# Patient Record
Sex: MALE | Race: OTHER | Employment: FULL TIME | URBAN - METROPOLITAN AREA
[De-identification: names, ages, dates, MRNs, and addresses within clinical notes are randomized per-mention and may not be internally consistent; named-entity substitution may affect disease eponyms.]

---

## 2017-05-15 ENCOUNTER — EMPLOYEE WELLNESS (OUTPATIENT)
Dept: OTHER | Age: 37
End: 2017-05-15

## 2017-05-15 LAB
CHOLESTEROL, TOTAL: 181 MG/DL (ref 0–199)
GLUCOSE BLD-MCNC: 87 MG/DL (ref 70–99)
HDLC SERPL-MCNC: 56 MG/DL (ref 40–60)
LDL CHOLESTEROL CALCULATED: 85 MG/DL
TRIGL SERPL-MCNC: 199 MG/DL (ref 0–150)

## 2018-03-20 VITALS — BODY MASS INDEX: 27.91 KG/M2 | WEIGHT: 189 LBS

## 2018-10-02 ENCOUNTER — OFFICE VISIT (OUTPATIENT)
Dept: DERMATOLOGY | Age: 38
End: 2018-10-02
Payer: COMMERCIAL

## 2018-10-02 DIAGNOSIS — L21.9 SEBORRHEIC DERMATITIS: ICD-10-CM

## 2018-10-02 DIAGNOSIS — L81.1 MELASMA: ICD-10-CM

## 2018-10-02 DIAGNOSIS — L71.0 PERIORIFICIAL DERMATITIS: Primary | ICD-10-CM

## 2018-10-02 PROCEDURE — 99202 OFFICE O/P NEW SF 15 MIN: CPT | Performed by: DERMATOLOGY

## 2018-10-02 RX ORDER — MINOCYCLINE HYDROCHLORIDE 50 MG/1
50 CAPSULE ORAL 2 TIMES DAILY
Qty: 60 CAPSULE | Refills: 1 | Status: SHIPPED | OUTPATIENT
Start: 2018-10-02 | End: 2019-08-27

## 2018-10-02 RX ORDER — PIMECROLIMUS 10 MG/G
CREAM TOPICAL
Qty: 30 G | Refills: 0 | Status: SHIPPED | OUTPATIENT
Start: 2018-10-02 | End: 2020-11-03 | Stop reason: ALTCHOICE

## 2019-04-13 ENCOUNTER — ANESTHESIA EVENT (OUTPATIENT)
Dept: OPERATING ROOM | Age: 39
End: 2019-04-13
Payer: COMMERCIAL

## 2019-04-13 ENCOUNTER — ANESTHESIA (OUTPATIENT)
Dept: OPERATING ROOM | Age: 39
End: 2019-04-13
Payer: COMMERCIAL

## 2019-04-13 ENCOUNTER — HOSPITAL ENCOUNTER (OUTPATIENT)
Age: 39
Setting detail: SURGERY ADMIT
Discharge: HOME | End: 2019-04-13
Attending: SURGERY | Admitting: SURGERY
Payer: COMMERCIAL

## 2019-04-13 ENCOUNTER — HOSPITAL ENCOUNTER (OUTPATIENT)
Dept: CT IMAGING | Age: 39
Discharge: HOME | End: 2019-04-13
Payer: COMMERCIAL

## 2019-04-13 VITALS
DIASTOLIC BLOOD PRESSURE: 116 MMHG | RESPIRATION RATE: 21 BRPM | HEART RATE: 106 BPM | TEMPERATURE: 97.8 F | OXYGEN SATURATION: 94 % | SYSTOLIC BLOOD PRESSURE: 157 MMHG

## 2019-04-13 VITALS
TEMPERATURE: 98.4 F | OXYGEN SATURATION: 100 % | DIASTOLIC BLOOD PRESSURE: 93 MMHG | RESPIRATION RATE: 20 BRPM | SYSTOLIC BLOOD PRESSURE: 153 MMHG

## 2019-04-13 DIAGNOSIS — R10.31 RLQ ABDOMINAL PAIN: ICD-10-CM

## 2019-04-13 DIAGNOSIS — R10.84 GENERALIZED ABDOMINAL PAIN: ICD-10-CM

## 2019-04-13 DIAGNOSIS — K35.30 ACUTE APPENDICITIS WITH LOCALIZED PERITONITIS, WITHOUT PERFORATION, ABSCESS, OR GANGRENE: Primary | ICD-10-CM

## 2019-04-13 PROCEDURE — 2500000003 HC RX 250 WO HCPCS

## 2019-04-13 PROCEDURE — 7100000010 HC PHASE II RECOVERY - FIRST 15 MIN: Performed by: SURGERY

## 2019-04-13 PROCEDURE — 6360000002 HC RX W HCPCS: Performed by: SURGERY

## 2019-04-13 PROCEDURE — 2580000003 HC RX 258: Performed by: SURGERY

## 2019-04-13 PROCEDURE — 74177 CT ABD & PELVIS W/CONTRAST: CPT

## 2019-04-13 PROCEDURE — 2500000003 HC RX 250 WO HCPCS: Performed by: SURGERY

## 2019-04-13 PROCEDURE — 3600000014 HC SURGERY LEVEL 4 ADDTL 15MIN: Performed by: SURGERY

## 2019-04-13 PROCEDURE — 99220 PR INITIAL OBSERVATION CARE/DAY 70 MINUTES: CPT | Performed by: SURGERY

## 2019-04-13 PROCEDURE — 2720000010 HC SURG SUPPLY STERILE: Performed by: SURGERY

## 2019-04-13 PROCEDURE — 6360000004 HC RX CONTRAST MEDICATION: Performed by: ORTHOPAEDIC SURGERY

## 2019-04-13 PROCEDURE — 3700000001 HC ADD 15 MINUTES (ANESTHESIA): Performed by: SURGERY

## 2019-04-13 PROCEDURE — 2580000003 HC RX 258: Performed by: PHYSICIAN ASSISTANT

## 2019-04-13 PROCEDURE — 2500000003 HC RX 250 WO HCPCS: Performed by: ANESTHESIOLOGY

## 2019-04-13 PROCEDURE — 6360000002 HC RX W HCPCS: Performed by: ANESTHESIOLOGY

## 2019-04-13 PROCEDURE — 44970 LAPAROSCOPY APPENDECTOMY: CPT | Performed by: SURGERY

## 2019-04-13 PROCEDURE — 7100000011 HC PHASE II RECOVERY - ADDTL 15 MIN: Performed by: SURGERY

## 2019-04-13 PROCEDURE — 88304 TISSUE EXAM BY PATHOLOGIST: CPT

## 2019-04-13 PROCEDURE — 2709999900 HC NON-CHARGEABLE SUPPLY: Performed by: SURGERY

## 2019-04-13 PROCEDURE — 3600000004 HC SURGERY LEVEL 4 BASE: Performed by: SURGERY

## 2019-04-13 PROCEDURE — 3700000000 HC ANESTHESIA ATTENDED CARE: Performed by: SURGERY

## 2019-04-13 PROCEDURE — 7100000000 HC PACU RECOVERY - FIRST 15 MIN: Performed by: SURGERY

## 2019-04-13 PROCEDURE — 7100000001 HC PACU RECOVERY - ADDTL 15 MIN: Performed by: SURGERY

## 2019-04-13 RX ORDER — ONDANSETRON 2 MG/ML
INJECTION INTRAMUSCULAR; INTRAVENOUS PRN
Status: DISCONTINUED | OUTPATIENT
Start: 2019-04-13 | End: 2019-04-13 | Stop reason: SDUPTHER

## 2019-04-13 RX ORDER — OXYCODONE HYDROCHLORIDE 5 MG/1
5 TABLET ORAL EVERY 6 HOURS PRN
Qty: 28 TABLET | Refills: 0 | Status: SHIPPED | OUTPATIENT
Start: 2019-04-13 | End: 2019-04-20

## 2019-04-13 RX ORDER — BUPIVACAINE HYDROCHLORIDE 5 MG/ML
INJECTION, SOLUTION EPIDURAL; INTRACAUDAL
Status: COMPLETED | OUTPATIENT
Start: 2019-04-13 | End: 2019-04-13

## 2019-04-13 RX ORDER — PROPOFOL 10 MG/ML
INJECTION, EMULSION INTRAVENOUS PRN
Status: DISCONTINUED | OUTPATIENT
Start: 2019-04-13 | End: 2019-04-13 | Stop reason: SDUPTHER

## 2019-04-13 RX ORDER — SUCCINYLCHOLINE/SOD CL,ISO/PF 200MG/10ML
SYRINGE (ML) INTRAVENOUS PRN
Status: DISCONTINUED | OUTPATIENT
Start: 2019-04-13 | End: 2019-04-13 | Stop reason: SDUPTHER

## 2019-04-13 RX ORDER — ROCURONIUM BROMIDE 10 MG/ML
INJECTION, SOLUTION INTRAVENOUS PRN
Status: DISCONTINUED | OUTPATIENT
Start: 2019-04-13 | End: 2019-04-13 | Stop reason: SDUPTHER

## 2019-04-13 RX ORDER — ONDANSETRON 2 MG/ML
4 INJECTION INTRAMUSCULAR; INTRAVENOUS
Status: DISCONTINUED | OUTPATIENT
Start: 2019-04-13 | End: 2019-04-13 | Stop reason: HOSPADM

## 2019-04-13 RX ORDER — FENTANYL CITRATE 50 UG/ML
25 INJECTION, SOLUTION INTRAMUSCULAR; INTRAVENOUS EVERY 5 MIN PRN
Status: DISCONTINUED | OUTPATIENT
Start: 2019-04-13 | End: 2019-04-13 | Stop reason: HOSPADM

## 2019-04-13 RX ORDER — DEXAMETHASONE SODIUM PHOSPHATE 4 MG/ML
INJECTION, SOLUTION INTRA-ARTICULAR; INTRALESIONAL; INTRAMUSCULAR; INTRAVENOUS; SOFT TISSUE PRN
Status: DISCONTINUED | OUTPATIENT
Start: 2019-04-13 | End: 2019-04-13 | Stop reason: SDUPTHER

## 2019-04-13 RX ORDER — FENTANYL CITRATE 50 UG/ML
INJECTION, SOLUTION INTRAMUSCULAR; INTRAVENOUS PRN
Status: DISCONTINUED | OUTPATIENT
Start: 2019-04-13 | End: 2019-04-13 | Stop reason: SDUPTHER

## 2019-04-13 RX ORDER — SODIUM CHLORIDE 9 MG/ML
INJECTION, SOLUTION INTRAVENOUS CONTINUOUS
Status: DISCONTINUED | OUTPATIENT
Start: 2019-04-13 | End: 2019-04-14 | Stop reason: HOSPADM

## 2019-04-13 RX ORDER — MIDAZOLAM HYDROCHLORIDE 1 MG/ML
INJECTION INTRAMUSCULAR; INTRAVENOUS PRN
Status: DISCONTINUED | OUTPATIENT
Start: 2019-04-13 | End: 2019-04-13 | Stop reason: SDUPTHER

## 2019-04-13 RX ORDER — FENTANYL CITRATE 50 UG/ML
50 INJECTION, SOLUTION INTRAMUSCULAR; INTRAVENOUS EVERY 5 MIN PRN
Status: DISCONTINUED | OUTPATIENT
Start: 2019-04-13 | End: 2019-04-13 | Stop reason: HOSPADM

## 2019-04-13 RX ORDER — KETOROLAC TROMETHAMINE 30 MG/ML
INJECTION, SOLUTION INTRAMUSCULAR; INTRAVENOUS PRN
Status: DISCONTINUED | OUTPATIENT
Start: 2019-04-13 | End: 2019-04-13 | Stop reason: SDUPTHER

## 2019-04-13 RX ORDER — MAGNESIUM HYDROXIDE 1200 MG/15ML
LIQUID ORAL CONTINUOUS PRN
Status: COMPLETED | OUTPATIENT
Start: 2019-04-13 | End: 2019-04-13

## 2019-04-13 RX ADMIN — PIPERACILLIN SODIUM,TAZOBACTAM SODIUM 3.38 G: 3; .375 INJECTION, POWDER, FOR SOLUTION INTRAVENOUS at 12:36

## 2019-04-13 RX ADMIN — FENTANYL CITRATE 100 MCG: 50 INJECTION INTRAMUSCULAR; INTRAVENOUS at 12:35

## 2019-04-13 RX ADMIN — SUGAMMADEX 300 MG: 100 INJECTION, SOLUTION INTRAVENOUS at 13:57

## 2019-04-13 RX ADMIN — SODIUM CHLORIDE: 9 INJECTION, SOLUTION INTRAVENOUS at 12:31

## 2019-04-13 RX ADMIN — DEXAMETHASONE SODIUM PHOSPHATE 8 MG: 4 INJECTION, SOLUTION INTRAMUSCULAR; INTRAVENOUS at 12:46

## 2019-04-13 RX ADMIN — KETOROLAC TROMETHAMINE 30 MG: 30 INJECTION, SOLUTION INTRAMUSCULAR at 13:54

## 2019-04-13 RX ADMIN — ROCURONIUM BROMIDE 35 MG: 10 INJECTION INTRAVENOUS at 12:44

## 2019-04-13 RX ADMIN — IOHEXOL 50 ML: 240 INJECTION, SOLUTION INTRATHECAL; INTRAVASCULAR; INTRAVENOUS; ORAL at 10:11

## 2019-04-13 RX ADMIN — MIDAZOLAM 2 MG: 1 INJECTION INTRAMUSCULAR; INTRAVENOUS at 12:31

## 2019-04-13 RX ADMIN — ROCURONIUM BROMIDE 10 MG: 10 INJECTION INTRAVENOUS at 13:28

## 2019-04-13 RX ADMIN — Medication 160 MG: at 12:35

## 2019-04-13 RX ADMIN — IOPAMIDOL 75 ML: 755 INJECTION, SOLUTION INTRAVENOUS at 10:11

## 2019-04-13 RX ADMIN — ONDANSETRON 4 MG: 2 INJECTION INTRAMUSCULAR; INTRAVENOUS at 12:46

## 2019-04-13 RX ADMIN — PROPOFOL 200 MG: 10 INJECTION, EMULSION INTRAVENOUS at 12:35

## 2019-04-13 RX ADMIN — ROCURONIUM BROMIDE 5 MG: 10 INJECTION INTRAVENOUS at 12:35

## 2019-04-13 ASSESSMENT — PULMONARY FUNCTION TESTS
PIF_VALUE: 24
PIF_VALUE: 21
PIF_VALUE: 21
PIF_VALUE: 23
PIF_VALUE: 23
PIF_VALUE: 19
PIF_VALUE: 24
PIF_VALUE: 4
PIF_VALUE: 14
PIF_VALUE: 15
PIF_VALUE: 24
PIF_VALUE: 22
PIF_VALUE: 14
PIF_VALUE: 24
PIF_VALUE: 18
PIF_VALUE: 24
PIF_VALUE: 23
PIF_VALUE: 18
PIF_VALUE: 22
PIF_VALUE: 24
PIF_VALUE: 23
PIF_VALUE: 17
PIF_VALUE: 21
PIF_VALUE: 24
PIF_VALUE: 19
PIF_VALUE: 24
PIF_VALUE: 24
PIF_VALUE: 23
PIF_VALUE: 24
PIF_VALUE: 25
PIF_VALUE: 24
PIF_VALUE: 24
PIF_VALUE: 18
PIF_VALUE: 4
PIF_VALUE: 21
PIF_VALUE: 24
PIF_VALUE: 14
PIF_VALUE: 12
PIF_VALUE: 25
PIF_VALUE: 18
PIF_VALUE: 21
PIF_VALUE: 24
PIF_VALUE: 15
PIF_VALUE: 19
PIF_VALUE: 2
PIF_VALUE: 23
PIF_VALUE: 20
PIF_VALUE: 15
PIF_VALUE: 18
PIF_VALUE: 15
PIF_VALUE: 23
PIF_VALUE: 25
PIF_VALUE: 0
PIF_VALUE: 21
PIF_VALUE: 24
PIF_VALUE: 18
PIF_VALUE: 25
PIF_VALUE: 24
PIF_VALUE: 23
PIF_VALUE: 21
PIF_VALUE: 22
PIF_VALUE: 23
PIF_VALUE: 23
PIF_VALUE: 14
PIF_VALUE: 1
PIF_VALUE: 24
PIF_VALUE: 18
PIF_VALUE: 23
PIF_VALUE: 23
PIF_VALUE: 24
PIF_VALUE: 24
PIF_VALUE: 23
PIF_VALUE: 24
PIF_VALUE: 20
PIF_VALUE: 24
PIF_VALUE: 17
PIF_VALUE: 18
PIF_VALUE: 24
PIF_VALUE: 14
PIF_VALUE: 24
PIF_VALUE: 18
PIF_VALUE: 19
PIF_VALUE: 1
PIF_VALUE: 0
PIF_VALUE: 15
PIF_VALUE: 22
PIF_VALUE: 24
PIF_VALUE: 13
PIF_VALUE: 24
PIF_VALUE: 23
PIF_VALUE: 18
PIF_VALUE: 24
PIF_VALUE: 14

## 2019-04-13 ASSESSMENT — PAIN - FUNCTIONAL ASSESSMENT: PAIN_FUNCTIONAL_ASSESSMENT: 0-10

## 2019-04-13 ASSESSMENT — PAIN DESCRIPTION - ONSET: ONSET: UNABLE TO TELL

## 2019-04-13 ASSESSMENT — PAIN DESCRIPTION - DESCRIPTORS
DESCRIPTORS: SHARP;SHOOTING
DESCRIPTORS: SHARP

## 2019-04-13 ASSESSMENT — PAIN SCALES - GENERAL
PAINLEVEL_OUTOF10: 0
PAINLEVEL_OUTOF10: 5

## 2019-04-13 ASSESSMENT — PAIN DESCRIPTION - ORIENTATION: ORIENTATION: LEFT

## 2019-04-13 ASSESSMENT — PAIN DESCRIPTION - FREQUENCY: FREQUENCY: CONTINUOUS

## 2019-04-13 ASSESSMENT — PAIN DESCRIPTION - LOCATION: LOCATION: ABDOMEN

## 2019-04-13 ASSESSMENT — PAIN DESCRIPTION - PAIN TYPE: TYPE: SURGICAL PAIN

## 2019-04-13 NOTE — H&P
lifestyle. Review of Systems:  General Denies any fever or chills  HEENT Denies any diplopia, tinnitus or vertigo  Resp Denies any shortness of breath, cough or wheezing  Cardiac Denies any chest pain, palpitations, claudication or edema  GI Denies any melena, hematochezia, hematemesis or pyrosis   Denies any frequency, urgency, hesitancy or incontinence  Heme Denies bruising or bleeding easily  Neuro Denies any focal motor or sensory deficits  OBJECTIVE:   VITALS:  vitals were not taken for this visit. CONSTITUTIONAL: Alert and oriented times 3, no acute distress and cooperative to examination with proper mood and affect. SKIN: Skin color, texture, turgor normal. No rashes or lesions. LYMPH: no cervical nodes, no inguinal nodes  HEENT: Head is normocephalic, atraumatic. EOMI, PERRLA. NECK: Supple, symmetrical, trachea midline, no adenopathy, thyroid symmetric, not enlarged and no tenderness, skin normal.  CHEST/LUNGS: chest symmetric with normal A/P diameter, normal respiratory rate and rhythm, lungs clear to auscultation without wheezes, rales or rhonchi. No accessory muscle use. Scars None   CARDIOVASCULAR: Heart sounds are normal.  Regular rate and rhythm without murmur, gallop or rub. Normal S1 and S2. . Carotid and femoral pulses 2+/4 and equal bilaterally. ABDOMEN: Normal shape. Tenderness: RLQ  RECTAL: deferred, not clinically indicated  NEUROLOGIC: There are no focalizing motor or sensory deficits. CN II-XII are grossly intact. Jerryl Boyers EXTREMITIES: no cyanosis, no clubbing and no edema. LABS:   No results for input(s): WBC, HGB, HCT, PLT, NA, K, CL, CO2, BUN, CREATININE, MG, PHOS, CALCIUM, PTT, INR, AST, ALT, BILITOT, BILIDIR, NITRU, COLORU, BACTERIA in the last 72 hours. Invalid input(s): PT, WBCU, RBCU, LEUKOCYTESUA  No results for input(s): ALKPHOS, ALT, AST, BILITOT, BILIDIR, LABALBU, AMYLASE, LIPASE in the last 72 hours.   CBC: No results found for: WBC, RBC, HGB, HCT, MCV, MCH, MCHC, RDW, PLT,

## 2019-04-13 NOTE — ANESTHESIA PRE PROCEDURE
Department of Anesthesiology  Preprocedure Note       Name:  Jaret Chiu   Age:  42 y.o.  :  1980                                          MRN:  [id number]         Date:  2019      Surgeon: Oriana Alarcon):  Annabel Jose MD    Procedure: LAPAROSCOPIC APPENDECTOMY (N/A Abdomen)    Medications prior to admission:   Prior to Admission medications    Medication Sig Start Date End Date Taking? Authorizing Provider   ranitidine (ZANTAC) 150 MG tablet Take 150 mg by mouth 2 times daily. Yes Historical Provider, MD   loratadine (CLARITIN) 10 MG tablet Take 10 mg by mouth daily. Yes Historical Provider, MD   oxyCODONE (ROXICODONE) 5 MG immediate release tablet Take 1 tablet by mouth every 6 hours as needed for Pain for up to 7 days. Intended supply: 7 days. Take lowest dose possible to manage pain 19  ARTIE Yeh   minocycline (MINOCIN;DYNACIN) 50 MG capsule Take 1 capsule by mouth 2 times daily 10/2/18   Luke Mccloud MD   pimecrolimus (ELIDEL) 1 % cream Apply topically 2 times daily until improved. 10/2/18   Luke Mccloud MD   tretinoin (RETIN-A) 0.025 % cream Apply a pea sized amount to the face at bedtime. 10/2/18   Luke Mccloud MD       Current medications:    Current Facility-Administered Medications   Medication Dose Route Frequency Provider Last Rate Last Dose    piperacillin-tazobactam (ZOSYN) 3.375 g in dextrose 5 % 50 mL IVPB (mini-bag)  3.375 g Intravenous Once Annabel Jose MD         Facility-Administered Medications Ordered in Other Encounters   Medication Dose Route Frequency Provider Last Rate Last Dose    0.9 % sodium chloride infusion   Intravenous Continuous ARTIE Yeh           Allergies:  No Known Allergies    Problem List:    Patient Active Problem List   Diagnosis Code    Acute appendicitis with localized peritonitis, without perforation, abscess, or gangrene K35.30       Past Medical History:  History reviewed.  No pertinent past medical history. Past Surgical History:  History reviewed. No pertinent surgical history. Social History:    Social History     Tobacco Use    Smoking status: Never Smoker    Smokeless tobacco: Never Used   Substance Use Topics    Alcohol use: Not on file                                Counseling given: Not Answered      Vital Signs (Current):   Vitals:    04/13/19 1217 04/13/19 1218   BP:  (!) 142/106   Pulse: 99    Resp: 16    Temp: 97.4 °F (36.3 °C)    TempSrc: Temporal    SpO2: 99%                                               BP Readings from Last 3 Encounters:   04/13/19 (!) 142/106   04/13/19 134/79   12/16/14 132/72       NPO Status: Time of last liquid consumption: 0900                        Time of last solid consumption: 2350                        Date of last liquid consumption: 04/13/19                        Date of last solid food consumption: 04/12/19    BMI:   Wt Readings from Last 3 Encounters:   05/15/17 189 lb (85.7 kg)   12/16/14 177 lb (80.3 kg)   05/06/14 175 lb (79.4 kg)     There is no height or weight on file to calculate BMI.    CBC: No results found for: WBC, RBC, HGB, HCT, MCV, RDW, PLT    CMP:   Lab Results   Component Value Date    GLUCOSE 87 05/15/2017       POC Tests: No results for input(s): POCGLU, POCNA, POCK, POCCL, POCBUN, POCHEMO, POCHCT in the last 72 hours.     Coags: No results found for: PROTIME, INR, APTT    HCG (If Applicable): No results found for: PREGTESTUR, PREGSERUM, HCG, HCGQUANT     ABGs: No results found for: PHART, PO2ART, VOA7EST, DKW9GQO, BEART, X5LCXJRO     Type & Screen (If Applicable):  No results found for: [facility]    Anesthesia Evaluation  Patient summary reviewed no history of anesthetic complications:   Airway: Mallampati: I  TM distance: >3 FB     Mouth opening: > = 3 FB Dental:          Pulmonary:Negative Pulmonary ROS breath sounds clear to auscultation                             Cardiovascular:Negative CV ROS  Exercise tolerance: good (>4 METS),           Rhythm: regular  Rate: normal                    Neuro/Psych:   Negative Neuro/Psych ROS              GI/Hepatic/Renal: Neg GI/Hepatic/Renal ROS            Endo/Other: Negative Endo/Other ROS                    Abdominal:           Vascular: negative vascular ROS. Anesthesia Plan      general     ASA 1 - emergent       Induction: intravenous and rapid sequence. MIPS: Postoperative opioids intended and Prophylactic antiemetics administered. Anesthetic plan and risks discussed with patient and spouse. DOS STAFF ADDENDUM:    Pt seen and examined, chart reviewed (including anesthesia, drug and allergy history). No interval changes to history and physical examination. Anesthetic plan, risks, benefits, alternatives, and personnel involved discussed with patient. Patient verbalized an understanding and agrees to proceed.       Zhen Hunter MD  April 13, 2019  12:38 PM            Zhen Hunter MD   4/13/2019

## 2019-04-13 NOTE — H&P
Surgery Consult Note     Fox Edouard PA-C  Pt Name: Dannie Floyd  MRN: [id number]  [place]: 1980  Date of evaluation: 4/13/2019  Primary Care Physician: Rose Romeo MD     IMPRESSIONS:   1. Acute appendicitis  PLANS:   1. Appendectomy  2. Monitor and control pain  3. OOB as tolerated  4. Antibiotics  5. IVF  SUBJECTIVE:   History of Chief Complaint:    Dannie Floyd is a 42 y.o. male who presents with RLQ abdominal pain. HE stated that this pain began last night. The pain continued in intensity, and he called me this AM.  At that time I ordered a CT scan that showed him to have an acute appendicitis. He denies having any other pain. Denies any CP, SOB, cough, lightheadedenss or dizziness. Past Medical History  Reviewed  has no past medical history on file. Past Surgical History  Reviewed has no past surgical history on file. Medications  Home Medications           Prior to Admission medications    Medication Sig Start Date End Date Taking? Authorizing Provider   minocycline (MINOCIN;DYNACIN) 50 MG capsule Take 1 capsule by mouth 2 times daily 10/2/18     Angela Steele MD   pimecrolimus (ELIDEL) 1 % cream Apply topically 2 times daily until improved. 10/2/18     Angela Steele MD   tretinoin (RETIN-A) 0.025 % cream Apply a pea sized amount to the face at bedtime. 10/2/18     Angela Steele MD   ranitidine (ZANTAC) 150 MG tablet Take 150 mg by mouth 2 times daily.       Historical Provider, MD   loratadine (CLARITIN) 10 MG tablet Take 10 mg by mouth daily.       Historical Provider, MD       Scheduled Meds:  Scheduled Medications        Continuous Infusions:  Infusions Meds    sodium chloride           PRN Meds:. Allergies  has No Known Allergies. Family History  Reviewedfamily history is not on file. Social History   reports that he has never smoked.  He has never used smokeless tobacco.  EDUCATION  Patient educated about their illness/diagnosis, stated above, and all questions answered. We discussed the importance of nutrition, medications they are taking, and healthy lifestyle. Review of Systems:  General Denies any fever or chills  HEENT Denies any diplopia, tinnitus or vertigo  Resp Denies any shortness of breath, cough or wheezing  Cardiac Denies any chest pain, palpitations, claudication or edema  GI Denies any melena, hematochezia, hematemesis or pyrosis   Denies any frequency, urgency, hesitancy or incontinence  Heme Denies bruising or bleeding easily  Neuro Denies any focal motor or sensory deficits  OBJECTIVE:   VITALS:  vitals were not taken for this visit. CONSTITUTIONAL: Alert and oriented times 3, no acute distress and cooperative to examination with proper mood and affect. SKIN: Skin color, texture, turgor normal. No rashes or lesions. LYMPH: no cervical nodes, no inguinal nodes  HEENT: Head is normocephalic, atraumatic. EOMI, PERRLA. NECK: Supple, symmetrical, trachea midline, no adenopathy, thyroid symmetric, not enlarged and no tenderness, skin normal.  CHEST/LUNGS: chest symmetric with normal A/P diameter, normal respiratory rate and rhythm, lungs clear to auscultation without wheezes, rales or rhonchi. No accessory muscle use. Scars None   CARDIOVASCULAR: Heart sounds are normal.  Regular rate and rhythm without murmur, gallop or rub. Normal S1 and S2. . Carotid and femoral pulses 2+/4 and equal bilaterally. ABDOMEN: Normal shape. Tenderness: RLQ  RECTAL: deferred, not clinically indicated  NEUROLOGIC: There are no focalizing motor or sensory deficits. CN II-XII are grossly intact. Velora Sis EXTREMITIES: no cyanosis, no clubbing and no edema.   LABS:   No results for input(s): WBC, HGB, HCT, PLT, NA, K, CL, CO2, BUN, CREATININE, MG, PHOS, CALCIUM, PTT, INR, AST, ALT, BILITOT, BILIDIR, NITRU, COLORU, BACTERIA in the last 72 hours.     Invalid input(s): PT, WBCU, RBCU, LEUKOCYTESUA  No results for input(s): ALKPHOS, ALT, AST, BILITOT, BILIDIR, LABALBU, AMYLASE, LIPASE in pain control and time away from work and daily activities. All questions were answered to patient's satisfaction.    ---------------------------------------------------------------------------------------------------------------------    CC/Reason for visit: RLQ pain    Detailed HPI per resident/NP note. PMH, PSH, Current Meds, Allergies reviewed. Fam Hx: no relevant fam Hx    REVIEW OF SYSTEMS:    Pertinent positives and negatives are mentioned in the HPI. Otherwise, all other systems were reviewed and negative. PHYSICAL EXAM:  BP (!) 142/106   Pulse 99   Temp 97.4 °F (36.3 °C) (Temporal)   Resp 16   SpO2 99%   Constitutional: Appears well-developed and well-nourished. Grooming appropriate. No gross deformities. There is no height or weight on file to calculate BMI. Eyes: No scleral icterus. Conjunctiva/lids normal. Vision intact grossly. Pupils equal/symmetric, reactive bilaterally. ENT: External ears/nose without defect, scars, or masses. Hearing grossly intact. No facial deformity. Lips normal, normal dentition. Neck: No masses. Trachea midline. No crepitus. Thyroid not enlarged. Cardiovascular: Normal rate. No peripheral edema. Abdominal aorta normal size to palpation. Pulmonary/Chest: Effort normal. No respiratory distress. No wheezes. No use of accessory muscles. Musculoskeletal: Normal range of motion x all 4 extremities and head/neck, without deformity, pain, or crepitus, with normal strength/tone. Nails without clubbing/cyanosis. Neurological: Alert and oriented to person, place, and time. No gross deficits. Sensation intact grossly. Skin: Skin is dry. No rashes noted. No pallor. No induration or nodules. Psychiatric: Normal mood and affect. Behavior normal. Oriented to person, place, and time. Judgment and insight reasonable.     Abdominal/wound: TTP RLQ    MDM:  Review/order labs: reviewed CBC, CMP  Review/order radiology: reviewed CT scan  Review/order other tests:

## 2019-04-13 NOTE — OP NOTE
OPERATIVE NOTE     Laith Kaplan  1980  [id number]  [facility] ORTHOPEDIC AND SP[facility] [place]    DATE OF PROCEDURE:  4/13/19    SURGEON:  Patience Burris M.D. PREOPERATIVE DIAGNOSIS:  Acute appendicitis. POSTOPERATIVE DIAGNOSIS:  Acute appendicitis. PROCEDURE:  Laparoscopic appendectomy. ANESTHESIA:  General endotracheal.    COMPLICATIONS:  None. SPECIMEN:  Appendix. FINDINGS:  Acute appendicitis, partially retrocecal location. INDICATIONS:  The patient is a 82-year-old male who presented with approximately 1 day of pain. Patient underwent a CT scan, which showed findings consistent with a dilated appendix and inflammatory changes. Patient was examined and imaging and lab findings were reviewed, which confirmed acute appendicitis. Patient was booked for laparoscopic appendectomy. I explained to patient as well as any present family, the risks, benefits, and alternatives of the procedure, including the risks of bleeding, infection, abscess formation, hernia, damage to intraabdominal structures including but not limited to: bowel, bladder, ureter, and other neurovascular structures. They understood the potential need for open operation or bowel resection. I answered all questions from the patient and available family regarding perioperative expectations and time away from work. Patient was consented to proceed. PROCEDURE DETAILS:  The patient was brought to operating room, placed supine  on the operating table. General endotracheal intubation was performed by anesthesiology. The left arm was padded and tucked. The patient's abdomen was prepped and draped in usual sterile fashion using chlorhexidine prep solution. A timeout was performed confirming the patient's identity as well as the operative site. Antibiotics were confirmed to be  perfusing. All safety points were followed. SCDs were on and were functioning. Chemical DVT prophylaxis was confirmed to be given.     A mix of 1% lidocaine and 0.5% bupivacaine with epinephrine was used to anesthetize the skin at Harris's point in the left upper quadrant, 2 fingerbreadth below the costal margin. An 11-blade scalpel was used to make a 5 mm incision. The 0 degree 5 mm laparoscope was used to enter the abdominal cavity using Optiview technique. No complications with abdominal entry. The abdomen was insufflated to 15 mm Hg. The patient was placed in slight Trendelenburg and right sided up positioning. Other ports were then placed under visualization, including a 12 mm port in the left lower quadrant, and a 5mm port in the suprapubic region. Using atraumatic graspers we did follow the taeniae coli of the cecum proximally to the appendix, which was then grasped and elevated. It appeared to be thickened and inflamed, consistent with acute appendicitis. There was no evidence of rupture. The Ligature device was then used to take down peritoneal attachments of the appendix as well as the cecum, as the appendix was partially retrocecally located. The ligature device was then used to dissect into the mesoappendix. A Rossi  grasper was then used to create a window between the mesoappendix and the base  of the appendix, and the ligature was used to incise the remainder of the mesoappendix, which was hemostatic. The Omer stapler, 45mm blue load was then used to staple across the base of the appendix flush with the cecum. We did confirm that there were no other structures near the staple line. A stapler was then fired in the  usual manner. The stapler was then removed and the Endocatch retrieval bag was placed into the abdominal cavity. The appendix was placed into the retrieval bag and brought out through the 12 mm left lower quadrant incision. This was passed off the table and will be sent down to pathology for permanent examination. We then inspected all 4 quadrants. There were no other gross abnormalities noted.  The staple line was inspected once

## 2019-04-13 NOTE — H&P
never smoked. He has never used smokeless tobacco.  EDUCATION  Patient educated about their illness/diagnosis, stated above, and all questions answered. We discussed the importance of nutrition, medications they are taking, and healthy lifestyle. Review of Systems:  General Denies any fever or chills  HEENT Denies any diplopia, tinnitus or vertigo  Resp Denies any shortness of breath, cough or wheezing  Cardiac Denies any chest pain, palpitations, claudication or edema  GI Denies any melena, hematochezia, hematemesis or pyrosis   Denies any frequency, urgency, hesitancy or incontinence  Heme Denies bruising or bleeding easily  Neuro Denies any focal motor or sensory deficits  OBJECTIVE:   VITALS:  temporal temperature is 97.4 °F (36.3 °C). His blood pressure is 142/106 (abnormal) and his pulse is 99. His respiration is 16 and oxygen saturation is 99%. CONSTITUTIONAL: Alert and oriented times 3, no acute distress and cooperative to examination with proper mood and affect. SKIN: Skin color, texture, turgor normal. No rashes or lesions. LYMPH: no cervical nodes, no inguinal nodes  HEENT: Head is normocephalic, atraumatic. EOMI, PERRLA. NECK: Supple, symmetrical, trachea midline, no adenopathy, thyroid symmetric, not enlarged and no tenderness, skin normal.  CHEST/LUNGS: chest symmetric with normal A/P diameter, normal respiratory rate and rhythm, lungs clear to auscultation without wheezes, rales or rhonchi. No accessory muscle use. Scars None   CARDIOVASCULAR: Heart sounds are normal.  Regular rate and rhythm without murmur, gallop or rub. Normal S1 and S2. . Carotid and femoral pulses 2+/4 and equal bilaterally. ABDOMEN: Normal shape. Tenderness: RLQ  RECTAL: deferred, not clinically indicated  NEUROLOGIC: There are no focalizing motor or sensory deficits. CN II-XII are grossly intact. Emiliano Burton EXTREMITIES: no cyanosis, no clubbing and no edema.   LABS:   No results for input(s): WBC, HGB, HCT, PLT, NA, K, CL, CO2, BUN, CREATININE, MG, PHOS, CALCIUM, PTT, INR, AST, ALT, BILITOT, BILIDIR, NITRU, COLORU, BACTERIA in the last 72 hours. Invalid input(s): PT, WBCU, RBCU, LEUKOCYTESUA  No results for input(s): ALKPHOS, ALT, AST, BILITOT, BILIDIR, LABALBU, AMYLASE, LIPASE in the last 72 hours. CBC: No results found for: WBC, RBC, HGB, HCT, MCV, MCH, MCHC, RDW, PLT, MPV  CMP:    Lab Results   Component Value Date    GLUCOSE 87 05/15/2017     Urine Culture:  No components found for: CURINE  Blood Culture:  No components found for: CBLOOD, CFUNGUSBL  RADIOLOGY:     CT scan abd/pelvis:  Mild to moderate acute appendicitis but without complicating feature such as   abscess or perforation.       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 Thank you for the interesting evaluation. Further recommendations to follow.       Electronically signed by ARTIE Mercado on 4/13/2019 at 2:04 PM

## 2019-04-13 NOTE — PROGRESS NOTES
Pt arrived to PACU from OR. Pt sleeping on RA, awakens easily. Abd soft and round. Abd incisions x3 DARION with surgical glue noted. Pt denies c/o pain at this time.   VSS

## 2019-08-27 ENCOUNTER — OFFICE VISIT (OUTPATIENT)
Dept: ENT CLINIC | Age: 39
End: 2019-08-27
Payer: COMMERCIAL

## 2019-08-27 VITALS
WEIGHT: 199 LBS | SYSTOLIC BLOOD PRESSURE: 133 MMHG | BODY MASS INDEX: 30.16 KG/M2 | HEIGHT: 68 IN | TEMPERATURE: 97.7 F | HEART RATE: 81 BPM | DIASTOLIC BLOOD PRESSURE: 93 MMHG

## 2019-08-27 DIAGNOSIS — K14.0 GLOSSITIS: Primary | ICD-10-CM

## 2019-08-27 DIAGNOSIS — H61.813 EXOSTOSIS OF BOTH EXTERNAL EAR CANALS: ICD-10-CM

## 2019-08-27 DIAGNOSIS — H61.23 BILATERAL IMPACTED CERUMEN: ICD-10-CM

## 2019-08-27 PROCEDURE — 69210 REMOVE IMPACTED EAR WAX UNI: CPT | Performed by: OTOLARYNGOLOGY

## 2019-08-27 PROCEDURE — 99243 OFF/OP CNSLTJ NEW/EST LOW 30: CPT | Performed by: OTOLARYNGOLOGY

## 2019-08-27 RX ORDER — MINOCYCLINE HYDROCHLORIDE 100 MG/1
100 CAPSULE ORAL 2 TIMES DAILY
Qty: 20 CAPSULE | Refills: 1 | Status: SHIPPED | OUTPATIENT
Start: 2019-08-27 | End: 2019-09-06

## 2019-09-12 ENCOUNTER — TELEPHONE (OUTPATIENT)
Dept: ENT CLINIC | Age: 39
End: 2019-09-12

## 2019-09-12 NOTE — TELEPHONE ENCOUNTER
Pt was seen on 8/27/19 for lesion on tongue. Pt calling to say he finished atb 2 days ago and lesion is unchanged.

## 2019-09-17 ENCOUNTER — OFFICE VISIT (OUTPATIENT)
Dept: ENT CLINIC | Age: 39
End: 2019-09-17
Payer: COMMERCIAL

## 2019-09-17 ENCOUNTER — TELEPHONE (OUTPATIENT)
Dept: DERMATOLOGY | Age: 39
End: 2019-09-17

## 2019-09-17 VITALS
DIASTOLIC BLOOD PRESSURE: 80 MMHG | HEART RATE: 89 BPM | SYSTOLIC BLOOD PRESSURE: 115 MMHG | HEIGHT: 69 IN | WEIGHT: 196 LBS | TEMPERATURE: 96.4 F | BODY MASS INDEX: 29.03 KG/M2

## 2019-09-17 DIAGNOSIS — K14.0 GLOSSITIS: Primary | ICD-10-CM

## 2019-09-17 PROCEDURE — 99212 OFFICE O/P EST SF 10 MIN: CPT | Performed by: OTOLARYNGOLOGY

## 2019-09-17 NOTE — PROGRESS NOTES
FOLLOW UP VISIT:      INTERIM HISTORY: Seen 1 week ago with a sore area on the dorsum of the tongue on the left. Treated with minocycline mouthwash. Still with some discomfort on the left anterior tongue. PAST MEDICAL HISTORY:   Social History     Tobacco Use   Smoking Status Never Smoker   Smokeless Tobacco Never Used   Tobacco Comment    will not start                                                     Social History     Substance and Sexual Activity   Alcohol Use Not on file                                                    Current Outpatient Medications:     pimecrolimus (ELIDEL) 1 % cream, Apply topically 2 times daily until improved. , Disp: 30 g, Rfl: 0    tretinoin (RETIN-A) 0.025 % cream, Apply a pea sized amount to the face at bedtime. , Disp: 20 g, Rfl: 4    ranitidine (ZANTAC) 150 MG tablet, Take 150 mg by mouth 2 times daily. , Disp: , Rfl:     loratadine (CLARITIN) 10 MG tablet, Take 10 mg by mouth daily. , Disp: , Rfl:                                                History reviewed. No pertinent past medical history. Past Surgical History:   Procedure Laterality Date    LAPAROSCOPIC APPENDECTOMY N/A 4/13/2019    LAPAROSCOPIC APPENDECTOMY performed by Anselmo Snyder MD at [facility]: Family history reviewed and except as pertinent to the interim history is not contributory. REVIEW OF SYSTEMS:  All pertinent positive and negative findings included in HPI. Otherwise, all other systems are reviewed and are negative    PHYSICAL EXAMINATION:   GENERAL: wdwn- no acute distress  RESPIRATORY: No stridor. COMMUNICATION :  Normal voice  MENTAL STATUS: Alert and oriented x3  HEAD AND FACE: No skin lesions of the face. EXTERNAL EARS AND NOSE: The external ears and nasal pyramid are normal.  FACIAL MUSCLES: All branches of the facial nerve intact. FACE PALPATION: Zygomatic arches and orbital rims are intact.   No tenderness over the

## 2019-09-17 NOTE — TELEPHONE ENCOUNTER
Pt lmvm 9.17.19 @ 3:01  Pt c/b [phone]  Pt states:   - MD with [facility]   - seen Dr. Fortino Lin   - ulcer on tongue   - about a month   - was on antibiotics not helping   - asking to be seen   Please call to discuss thanks

## 2019-09-19 ENCOUNTER — OFFICE VISIT (OUTPATIENT)
Dept: DERMATOLOGY | Age: 39
End: 2019-09-19
Payer: COMMERCIAL

## 2019-09-19 DIAGNOSIS — K14.1 GLOSSITIS, BENIGN MIGRATORY: Primary | ICD-10-CM

## 2019-09-19 PROCEDURE — 99213 OFFICE O/P EST LOW 20 MIN: CPT | Performed by: DERMATOLOGY

## 2019-09-19 RX ORDER — DEXAMETHASONE 0.5 MG/5ML
0.5 ELIXIR ORAL 3 TIMES DAILY
Qty: 210 ML | Refills: 1 | Status: SHIPPED | OUTPATIENT
Start: 2019-09-19 | End: 2019-10-03

## 2019-10-04 ENCOUNTER — OFFICE VISIT (OUTPATIENT)
Dept: INTERNAL MEDICINE CLINIC | Age: 39
End: 2019-10-04
Payer: COMMERCIAL

## 2019-10-04 VITALS
HEART RATE: 84 BPM | SYSTOLIC BLOOD PRESSURE: 115 MMHG | WEIGHT: 195 LBS | RESPIRATION RATE: 12 BRPM | HEIGHT: 69 IN | DIASTOLIC BLOOD PRESSURE: 80 MMHG | OXYGEN SATURATION: 99 % | BODY MASS INDEX: 28.88 KG/M2

## 2019-10-04 DIAGNOSIS — K21.9 GASTROESOPHAGEAL REFLUX DISEASE WITHOUT ESOPHAGITIS: ICD-10-CM

## 2019-10-04 DIAGNOSIS — Z79.899 DRUG THERAPY: ICD-10-CM

## 2019-10-04 DIAGNOSIS — Z11.4 SCREENING FOR HIV (HUMAN IMMUNODEFICIENCY VIRUS): ICD-10-CM

## 2019-10-04 DIAGNOSIS — Z13.220 SCREENING FOR HYPERLIPIDEMIA: ICD-10-CM

## 2019-10-04 DIAGNOSIS — Z00.00 ANNUAL PHYSICAL EXAM: Primary | ICD-10-CM

## 2019-10-04 DIAGNOSIS — Z13.1 SCREENING FOR DIABETES MELLITUS: ICD-10-CM

## 2019-10-04 PROCEDURE — 99385 PREV VISIT NEW AGE 18-39: CPT | Performed by: INTERNAL MEDICINE

## 2019-10-04 RX ORDER — ESOMEPRAZOLE MAGNESIUM 40 MG/1
40 CAPSULE, DELAYED RELEASE ORAL
Qty: 90 CAPSULE | Refills: 3 | Status: SHIPPED | OUTPATIENT
Start: 2019-10-04 | End: 2020-10-07 | Stop reason: SDUPTHER

## 2019-10-04 ASSESSMENT — PATIENT HEALTH QUESTIONNAIRE - PHQ9
SUM OF ALL RESPONSES TO PHQ QUESTIONS 1-9: 0
1. LITTLE INTEREST OR PLEASURE IN DOING THINGS: 0
2. FEELING DOWN, DEPRESSED OR HOPELESS: 0
SUM OF ALL RESPONSES TO PHQ QUESTIONS 1-9: 0
SUM OF ALL RESPONSES TO PHQ9 QUESTIONS 1 & 2: 0

## 2019-11-13 DIAGNOSIS — Z00.00 ANNUAL PHYSICAL EXAM: ICD-10-CM

## 2019-11-13 DIAGNOSIS — Z13.220 SCREENING FOR HYPERLIPIDEMIA: ICD-10-CM

## 2019-11-13 DIAGNOSIS — Z79.899 DRUG THERAPY: ICD-10-CM

## 2019-11-13 DIAGNOSIS — Z13.1 SCREENING FOR DIABETES MELLITUS: ICD-10-CM

## 2019-11-13 DIAGNOSIS — K21.9 GASTROESOPHAGEAL REFLUX DISEASE WITHOUT ESOPHAGITIS: ICD-10-CM

## 2019-11-13 DIAGNOSIS — Z11.4 SCREENING FOR HIV (HUMAN IMMUNODEFICIENCY VIRUS): ICD-10-CM

## 2019-11-13 LAB
A/G RATIO: 2.6 (ref 1.1–2.2)
ALBUMIN SERPL-MCNC: 5.2 G/DL (ref 3.4–5)
ALP BLD-CCNC: 70 U/L (ref 40–129)
ALT SERPL-CCNC: 21 U/L (ref 10–40)
ANION GAP SERPL CALCULATED.3IONS-SCNC: 14 MMOL/L (ref 3–16)
AST SERPL-CCNC: 26 U/L (ref 15–37)
BASOPHILS ABSOLUTE: 0 K/UL (ref 0–0.2)
BASOPHILS RELATIVE PERCENT: 0.4 %
BILIRUB SERPL-MCNC: 0.5 MG/DL (ref 0–1)
BUN BLDV-MCNC: 15 MG/DL (ref 7–20)
CALCIUM SERPL-MCNC: 9.6 MG/DL (ref 8.3–10.6)
CHLORIDE BLD-SCNC: 97 MMOL/L (ref 99–110)
CHOLESTEROL, TOTAL: 200 MG/DL (ref 0–199)
CO2: 25 MMOL/L (ref 21–32)
CREAT SERPL-MCNC: 0.8 MG/DL (ref 0.9–1.3)
EOSINOPHILS ABSOLUTE: 0.2 K/UL (ref 0–0.6)
EOSINOPHILS RELATIVE PERCENT: 3.5 %
GFR AFRICAN AMERICAN: >60
GFR NON-AFRICAN AMERICAN: >60
GLOBULIN: 2 G/DL
GLUCOSE BLD-MCNC: 94 MG/DL (ref 70–99)
HCT VFR BLD CALC: 44.6 % (ref 40.5–52.5)
HDLC SERPL-MCNC: 55 MG/DL (ref 40–60)
HEMOGLOBIN: 15.2 G/DL (ref 13.5–17.5)
HIV AG/AB: NORMAL
HIV ANTIGEN: NORMAL
HIV-1 ANTIBODY: NORMAL
HIV-2 AB: NORMAL
LDL CHOLESTEROL CALCULATED: 127 MG/DL
LYMPHOCYTES ABSOLUTE: 2.2 K/UL (ref 1–5.1)
LYMPHOCYTES RELATIVE PERCENT: 33.9 %
MAGNESIUM: 2 MG/DL (ref 1.8–2.4)
MCH RBC QN AUTO: 30.5 PG (ref 26–34)
MCHC RBC AUTO-ENTMCNC: 34.1 G/DL (ref 31–36)
MCV RBC AUTO: 89.4 FL (ref 80–100)
MONOCYTES ABSOLUTE: 0.5 K/UL (ref 0–1.3)
MONOCYTES RELATIVE PERCENT: 8.3 %
NEUTROPHILS ABSOLUTE: 3.5 K/UL (ref 1.7–7.7)
NEUTROPHILS RELATIVE PERCENT: 53.9 %
PDW BLD-RTO: 13.1 % (ref 12.4–15.4)
PLATELET # BLD: 167 K/UL (ref 135–450)
PMV BLD AUTO: 9.9 FL (ref 5–10.5)
POTASSIUM SERPL-SCNC: 4.4 MMOL/L (ref 3.5–5.1)
RBC # BLD: 4.98 M/UL (ref 4.2–5.9)
SODIUM BLD-SCNC: 136 MMOL/L (ref 136–145)
TOTAL PROTEIN: 7.2 G/DL (ref 6.4–8.2)
TRIGL SERPL-MCNC: 91 MG/DL (ref 0–150)
TSH SERPL DL<=0.05 MIU/L-ACNC: 1.82 UIU/ML (ref 0.27–4.2)
VITAMIN D 25-HYDROXY: 25.4 NG/ML
VLDLC SERPL CALC-MCNC: 18 MG/DL
WBC # BLD: 6.5 K/UL (ref 4–11)

## 2020-08-20 ENCOUNTER — OFFICE VISIT (OUTPATIENT)
Dept: ENT CLINIC | Age: 40
End: 2020-08-20
Payer: COMMERCIAL

## 2020-08-20 VITALS
TEMPERATURE: 97.3 F | DIASTOLIC BLOOD PRESSURE: 100 MMHG | BODY MASS INDEX: 27.76 KG/M2 | OXYGEN SATURATION: 99 % | HEART RATE: 106 BPM | WEIGHT: 188 LBS | SYSTOLIC BLOOD PRESSURE: 143 MMHG

## 2020-08-20 PROCEDURE — 69210 REMOVE IMPACTED EAR WAX UNI: CPT | Performed by: OTOLARYNGOLOGY

## 2020-08-20 NOTE — PROGRESS NOTES
Patient is following up for cerumen impactions. He has had his ears cleaned for a long time. 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 History of being a swimmer. No new complaints    Bilateral ear exam with cerumen removal  Right ear was visualized binocular scope. The patient had quite a bit of cerumen that I evacuated the Saavedra suction. This showed a fairly large bony exostosis of the anterior canal creating about a 50% obstruction. The underlying tympanic membrane that I can visualize appeared to be normal    On the left side he had minimal amount of wax removed with a Saavedra suction. He has a much smaller exit [place] of the posterior canal.  Tympanic membrane intact with an aerated middle ear    Plan  Patient will follow-up as needed to have the cerumen removed. If there is clear evidence that the right exostosis is enlarging, would consider surgical intervention to remove.

## 2020-10-07 RX ORDER — ESOMEPRAZOLE MAGNESIUM 40 MG/1
40 CAPSULE, DELAYED RELEASE ORAL
Qty: 90 CAPSULE | Refills: 3 | Status: SHIPPED | OUTPATIENT
Start: 2020-10-07 | End: 2021-09-29 | Stop reason: SDUPTHER

## 2020-11-03 ENCOUNTER — OFFICE VISIT (OUTPATIENT)
Dept: INTERNAL MEDICINE CLINIC | Age: 40
End: 2020-11-03
Payer: COMMERCIAL

## 2020-11-03 VITALS
WEIGHT: 190 LBS | DIASTOLIC BLOOD PRESSURE: 66 MMHG | SYSTOLIC BLOOD PRESSURE: 120 MMHG | RESPIRATION RATE: 12 BRPM | HEART RATE: 92 BPM | BODY MASS INDEX: 28.14 KG/M2 | HEIGHT: 69 IN | TEMPERATURE: 97.6 F

## 2020-11-03 PROCEDURE — 99395 PREV VISIT EST AGE 18-39: CPT | Performed by: INTERNAL MEDICINE

## 2020-11-03 ASSESSMENT — PATIENT HEALTH QUESTIONNAIRE - PHQ9
SUM OF ALL RESPONSES TO PHQ QUESTIONS 1-9: 0
SUM OF ALL RESPONSES TO PHQ9 QUESTIONS 1 & 2: 0
1. LITTLE INTEREST OR PLEASURE IN DOING THINGS: 0
SUM OF ALL RESPONSES TO PHQ QUESTIONS 1-9: 0
2. FEELING DOWN, DEPRESSED OR HOPELESS: 0
SUM OF ALL RESPONSES TO PHQ QUESTIONS 1-9: 0

## 2020-11-03 NOTE — PROGRESS NOTES
[facility]) Physicians  Internal Medicine  Patient Encounter  [address] [facility], D.O., [facility] Preventative Physical    Chief Complaint   Patient presents with    Annual Exam       HPI-- 47 y.o. male presents today requesting his annual preventative history and physical      Medical/Surgical Histories     Past Medical History:   Diagnosis Date    Appendicitis 04/23/2019    Chicken pox     GERD (gastroesophageal reflux disease)     Seasonal allergies           Past Surgical History:   Procedure Laterality Date    LAPAROSCOPIC APPENDECTOMY N/A 4/13/2019    LAPAROSCOPIC APPENDECTOMY performed by Cris Patterson MD at [facility]           Medications/Allergies     Current Outpatient Medications   Medication Sig Dispense Refill    esomeprazole (NEXIUM) 40 MG delayed release capsule Take 1 capsule by mouth every morning (before breakfast) 90 capsule 3    tretinoin (RETIN-A) 0.025 % cream APPLY A PEA SIZED AMOUNT EXTERNALLY TO FACE AT BEDTIME 20 g 3     No current facility-administered medications for this visit. No Known Allergies      Substance Use History     Social History     Tobacco Use    Smoking status: Never Smoker    Smokeless tobacco: Never Used    Tobacco comment: will not start    Substance Use Topics    Alcohol use: Yes     Alcohol/week: 5.0 standard drinks     Types: 3 Glasses of wine, 2 Shots of liquor per week    Drug use: Never          Family History     Family History   Problem Relation Age of Onset    High Blood Pressure Mother     High Blood Pressure Father     Colon Polyps Father     Colon Cancer Paternal Grandmother     Lung Cancer Maternal Grandfather     Brain Cancer Paternal Grandfather         Glioblastoma              REVIEW OF SYSTEMS:    CONSTITUTIONAL:  Neg   Recent weight changes, fatigue, fever, chills or night sweats, anorexia, Sleep difficulties  EYES: Neg  Blurry vision, loss of vision, double vision, tearing, itching, eye pain.   EARS:  Neg Hearing loss, tinnitus, vertigo, discharge or otalgia. NOSE:  Neg  Rhinorrhea, sneezing, itching, allergy, epistaxis, or snoring  MOUTH/THROAT:  Neg Bleeding gums, hoarseness, sore throat, dysphagia, throat infections, or dentures  RESPIRATORY:  Neg SOB ,wheeze, cough, sputum, hemoptysis. No report of + TB test.    CARDIOVASCULAR:  Neg Chest pain, palpitations, heart murmur, dyspnea on exertion, orthopnea, paroxysmal nocturnal dyspnea or edema of extremities, or claudication  GASTROINTESTINAL:  Neg   Nausea, vomiting, or diarrhea, constipation,  hematemesis, heart burn, dysphagia,change in bowel movements or stool caliber, hematochezia, melena, abdominal pain, or food intolerance. Colonoscopy: No  GENITOURINARY:  Neg  Urinary frequency, hesitancy, urgency, polyuria, dysuria, hematuria, nocturia, incontinence, change in stream, genital pain or swelling, kidney stone  HEMATOLOGIC/LYMPHATIC:  Neg  Anemia, bleeding dyscrasias, easy bruising, blood clots (DVT/PE), transfusions, or enlarged lymph nodes  MUSCULOSKELETAL:  Neg  New myalgias, bone pain, joint pain, joint swelling, neck pain, radicular pain, or fractures.  + Low back ache occasionally. NEUROLOGICAL:  Neg  Loss of Consciousness, memeory loss or forgetfulness, confusion, difficulty concentrating, seizures, insomina, aphasia or dysarthria unilateral weakness or paresthesias, ataxia, headaches, syncope, tremor, or H/O head trauma. PSYCHIATRIC:  Neg  Depression, anxiety, personality changes, nervousness, drug or alcohol use/abuse, H/O psych counseling: No, Psychotropics: No  SKIN :  Neg  Rash, nail changes, sun burns, tattoos, change in moles, or skin color changes  ENDOCRINE:  Neg  Polydipsia,polyuria,abnormal weight changes,heat /cold intolerance, Hair changes. No H/O Diabetes or Thyroid disease.      Preventive Care:    Health Maintenance   Topic Date Due    Flu vaccine (1) 09/01/2020    DTaP/Tdap/Td vaccine (2 - Td) 03/29/2023    HIV screen  Completed    Hepatitis A vaccine  Aged Out    Hepatitis B vaccine  Aged Out    Hib vaccine  Aged Out    Meningococcal (ACWY) vaccine  Aged Out    Pneumococcal 0-64 years Vaccine  Aged Out    Varicella vaccine  Discontinued      Self-testicular exams: Yes  Sexual activity: single partner, contraception - none   Last eye exam: 2019, normal, glasses  Exercise: no regular exercise  Seatbelt use: yes  Sunscreen Use: Yes      Physical Exam    /66   Pulse 92   Temp 97.6 °F (36.4 °C)   Resp 12   Ht 5' 9\" (1.753 m)   Wt 190 lb (86.2 kg)   BMI 28.06 kg/m²     Wt Readings from Last 3 Encounters:   11/03/20 190 lb (86.2 kg)   08/20/20 188 lb (85.3 kg)   10/04/19 195 lb (88.5 kg)       GEN:  47 y.o. male who is in NAD, A&O. He appears stated age and well nourished. He is cooperative and pleasant. HEAD:  NC/AT, no lesions. EYES:  ALISA, EOMI, No scleral icterus or conjunctival injection or discharge. Visual fields in tact to confrontation. Fundoscopic (non-dilated) grossly normal.  Disc margins well demarcated. EARS:  EAC's clear, TM's normal.  NOSE:  Nasal cavity is clear. No mucosal congestion or discharge. Sinuses are nontender. MOUTH & THROAT:  Oral cavity is clear without mucosal lesions. Tongue is midline. Dentition is in good repair. No pharyngeal erythema or exudate. NECK:  Supple. Full ROM. Trachea is midline. No increased JVD. No thyromegaly or nodules. No masses  LYMPH: No C/SC/A/F nodes  CARDIAC:  S1S2 NL. Regular rhythm. No murmur/clicks/rubs. No ectopy. PMI is non-displaced. VASC:  Pedal pulses 2/4. Carotid upstrokes 2+. No bruits noted. PULM:  Lungs are CTA. Symmetric breath sounds noted. AP Diameter NL. GI:  Abdomen is soft and nontender. No distension. No organomegaly. No masses. No pulsatile masses. : Deferred at this time  BREAST:  Deferred at this time  EXT:  No Cyanosis or clubbing. No edema.     SKIN: Warm and dry, normal turgor, no rash or lesions of concern. NEURO:  Cranial nerves 2-12 are NL. Speech fluent and coherent. Strength is 5/5 in all muscle groups. No sensory deficits. No focal or lateralizing deficits. Reflexes 2/4 and symmetric. Gait is normal.  MS:  No C/T/L paraspinal tenderness. No scoliosis. No joint effusions. Full joint ROM. PSYCH:  Mood and affect NL. Judgement and insight NL. Assessment/Plan:  Zuleika Goldman was seen today for annual exam.    Diagnoses and all orders for this visit:    Annual physical exam  --Gerald Yip is a 68-year-old healthy male seen today for his complete annual physical.  --All care gaps identified and addressed. --Given his family history of cancers, would start prostate cancer screening at age 39 and with his father's history of colon polyp would start screening colonoscopies at age 36.  -     Magnesium; Future  -     Basic Metabolic Panel; Future  -     Lipid Panel; Future  -     Vitamin D 25 Hydroxy; Future    Screening for diabetes mellitus  -     Basic Metabolic Panel; Future    Screening for hyperlipidemia  -     Lipid Panel; Future    Gastroesophageal reflux disease without esophagitis  -     Magnesium; Future    Vitamin D deficiency  -     Vitamin D 25 Hydroxy;  Future           Preventive plan of care for Becca Alvarez        11/3/2020           Preventive Measures Status       Recommendations for screening   Prostate Cancer Screen  No results found for: PSA  Test is due at age 39    Colon Cancer Screen  Last colonoscopy: None Test is due at age 36   Diabetes Screen  Glucose (mg/dL)   Date Value   2019 94    Test recommended and ordered   Cholesterol Screen  Lab Results   Component Value Date    CHOL 200 (H) 2019    TRIG 91 2019    HDL 55 2019    LDLCALC 127 (H) 2019    Test recommended and ordered   Hepatitis C screening: None Recommended for patients born between [address] screenin2019 Recommended for patients between the ages of 15-65 who have never been tested regardless of risk   Aspirin for Cardiovascular Prevention   No Not indicated    Recommended Immunizations    Immunization History   Administered Date(s) Administered    Influenza Virus Vaccine 10/22/2019    Influenza vaccine: recommended every fall     Pneumonia vaccine: Due at age 72    Shingles vaccine: recommended as a one time dose after the age of 61    Tetanus vaccine: tetanus and diptheria/pertussis vaccine (Td/Tdap) recommended every 10 years- due 2029              Additional Recommendations   1. Use Sunscreen daily when exposed to the sun to reduce the risk of skin cancer. 2. Continue a healthy lifestyle including a healthy diet and aerobic exercise  3. Always wear a seat belt  4. Always wear a helmet when riding a bike or motorcycle  5. Update eye exam every 2 years  6. Perform monthly self testicle checks    Here are a few  Reliable websites with a variety of health and wellness information:     www.myProspectNowcheck. heart. org     www.nutritionsource. org     www. americanheart. org     www. diabetes. org     www.[facility]     www.[facility] CenterXenetic BiosciencesCash Check CardMer[facility] Hospital Washington)

## 2020-11-03 NOTE — PATIENT INSTRUCTIONS
Patient Education        Learning About Vitamin D  Why is it important to get enough vitamin D? Your body needs vitamin D to absorb calcium. Calcium keeps your bones and muscles, including your heart, healthy and strong. If your muscles don't get enough calcium, they can cramp, hurt, or feel weak. You may have long-term (chronic) muscle aches and pains. If you don't get enough vitamin D throughout life, you have an increased chance of having thin and brittle bones (osteoporosis) in your later years. Children who don't get enough vitamin D may not grow as much as others their age. They also have a chance of getting a rare disease called rickets. It causes weak bones. Vitamin D and calcium are added to many foods. And your body uses sunshine to make its own vitamin D. How much vitamin D do you need? The [place] of Medicine recommends that people ages 3 through 79 get 600 IU (international units) every day. Adults 71 and older need 800 IU every day. Blood tests for vitamin D can check your vitamin D level. But there is no standard normal range used by all laboratories. You're likely getting enough vitamin D if your levels are in the range of 20 to 50 ng/mL. How can you get more vitamin D? Foods that contain vitamin D include:  · [place], tuna, and mackerel. These are some of the best foods to eat when you need to get more vitamin D.  · Cheese, egg yolks, and beef liver. These foods have vitamin D in small amounts. · Milk, soy drinks, orange juice, yogurt, margarine, and some kinds of cereal have vitamin D added to them. Some people don't make vitamin D as well as others. They may have to take extra care in getting enough vitamin D. Things that reduce how much vitamin D your body makes include:  · Dark skin, such as many  Americans have. · Age, especially if you are older than 72. · Digestive problems, such as Crohn's or celiac disease. · Liver and kidney disease.   Some people who do not get eating late at night and losing weight. Follow-up care is a key part of your treatment and safety. Be sure to make and go to all appointments, and call your doctor if you are having problems. It's also a good idea to know your test results and keep a list of the medicines you take. How can you care for yourself at home? · Take your medicines exactly as prescribed. Call your doctor if you think you are having a problem with your medicine. · Your doctor may recommend over-the-counter medicine. For mild or occasional indigestion, antacids, such as Tums, Gaviscon, Mylanta, or Maalox, may help. Your doctor also may recommend over-the-counter acid reducers, such as famotidine (Pepcid AC), cimetidine (Tagamet HB), or omeprazole (Prilosec). Read and follow all instructions on the label. If you use these medicines often, talk with your doctor. · Change your eating habits. ? It's best to eat several small meals instead of two or three large meals. ? After you eat, wait 2 to 3 hours before you lie down. ? Chocolate, mint, and alcohol can make GERD worse. ? Spicy foods, foods that have a lot of acid (like tomatoes and oranges), and coffee can make GERD symptoms worse in some people. If your symptoms are worse after you eat a certain food, you may want to stop eating that food to see if your symptoms get better. · Do not smoke or chew tobacco. Smoking can make GERD worse. If you need help quitting, talk to your doctor about stop-smoking programs and medicines. These can increase your chances of quitting for good. · If you have GERD symptoms at night, raise the head of your bed 6 to 8 inches by putting the frame on blocks or placing a foam wedge under the head of your mattress. (Adding extra pillows does not work.)  · Do not wear tight clothing around your middle. · Lose weight if you need to. Losing just 5 to 10 pounds can help. When should you call for help?    Call your doctor now or seek immediate medical care such as running, swimming, cycling, or playing tennis or team sports. Discuss any changes in your exercise program with your doctor. · Do not smoke or allow others to smoke around you. If you need help quitting, talk to your doctor about stop-smoking programs and medicines. These can increase your chances of quitting for good. · Talk to your doctor about whether you have any risk factors for sexually transmitted infections (STIs). Having one sex partner (who does not have STIs and does not have sex with anyone else) is a good way to avoid these infections. · Use birth control if you do not want to have children at this time. Talk with your doctor about the choices available and what might be best for you. · Protect your skin from too much sun. When you're outdoors from 10 a.m. to 4 p.m., stay in the shade or cover up with clothing and a hat with a wide brim. Wear sunglasses that block UV rays. Even when it's cloudy, put broad-spectrum sunscreen (SPF 30 or higher) on any exposed skin. · See a dentist one or two times a year for checkups and to have your teeth cleaned. · Wear a seat belt in the car. Follow your doctor's advice about when to have certain tests. These tests can spot problems early. For everyone  · Cholesterol. Have the fat (cholesterol) in your blood tested after age 21. Your doctor will tell you how often to have this done based on your age, family history, or other things that can increase your risk for heart disease. · Blood pressure. Have your blood pressure checked during a routine doctor visit. Your doctor will tell you how often to check your blood pressure based on your age, your blood pressure results, and other factors. · Vision. Talk with your doctor about how often to have a glaucoma test.  · Diabetes. Ask your doctor whether you should have tests for diabetes. · Colon cancer. Your risk for colorectal cancer gets higher as you get older.  Some experts say that adults should start regular screening at age 48 and stop at age 76. Others say to start before age 48 or continue after age 76. Talk with your doctor about your risk and when to start and stop screening. For women  · Breast exam and mammogram. Talk to your doctor about when you should have a clinical breast exam and a mammogram. Medical experts differ on whether and how often women under 50 should have these tests. Your doctor can help you decide what is right for you. · Cervical cancer screening test and pelvic exam. Begin with a Pap test at age 24. The test often is part of a pelvic exam. Starting at age 27, you may choose to have a Pap test, an HPV test, or both tests at the same time (called co-testing). Talk with your doctor about how often to have testing. · Tests for sexually transmitted infections (STIs). Ask whether you should have tests for STIs. You may be at risk if you have sex with more than one person, especially if your partners do not wear condoms. For men  · Tests for sexually transmitted infections (STIs). Ask whether you should have tests for STIs. You may be at risk if you have sex with more than one person, especially if you do not wear a condom. · Testicular cancer exam. Ask your doctor whether you should check your testicles regularly. · Prostate exam. Talk to your doctor about whether you should have a blood test (called a PSA test) for prostate cancer. Experts differ on whether and when men should have this test. Some experts suggest it if you are older than 39 and are -American or have a father or brother who got prostate cancer when he was younger than 72. When should you call for help? Watch closely for changes in your health, and be sure to contact your doctor if you have any problems or symptoms that concern you. Where can you learn more? Go to https://kurtis.health-partners. org and sign in to your [employer] account.  Enter P072 in the InCrowd box to learn more about \"Well Visit, Ages 25 to 48: Care Instructions. \"     If you do not have an account, please click on the \"Sign Up Now\" link. Current as of: May 27, 2020               Content Version: 12.6  © 9046-3734 [employer]. Care instructions adapted under license by [facility] ([facility]). If you have questions about a medical condition or this instruction, always ask your healthcare professional. [address] any warranty or liability for your use of this information.

## 2020-12-08 DIAGNOSIS — Z13.220 SCREENING FOR HYPERLIPIDEMIA: ICD-10-CM

## 2020-12-08 DIAGNOSIS — K21.9 GASTROESOPHAGEAL REFLUX DISEASE WITHOUT ESOPHAGITIS: ICD-10-CM

## 2020-12-08 DIAGNOSIS — E55.9 VITAMIN D DEFICIENCY: ICD-10-CM

## 2020-12-08 DIAGNOSIS — Z13.1 SCREENING FOR DIABETES MELLITUS: ICD-10-CM

## 2020-12-08 DIAGNOSIS — Z00.00 ANNUAL PHYSICAL EXAM: ICD-10-CM

## 2020-12-08 LAB
ANION GAP SERPL CALCULATED.3IONS-SCNC: 14 MMOL/L (ref 3–16)
BUN BLDV-MCNC: 15 MG/DL (ref 7–20)
CALCIUM SERPL-MCNC: 10.1 MG/DL (ref 8.3–10.6)
CHLORIDE BLD-SCNC: 99 MMOL/L (ref 99–110)
CHOLESTEROL, TOTAL: 229 MG/DL (ref 0–199)
CO2: 26 MMOL/L (ref 21–32)
CREAT SERPL-MCNC: 0.9 MG/DL (ref 0.9–1.3)
GFR AFRICAN AMERICAN: >60
GFR NON-AFRICAN AMERICAN: >60
GLUCOSE BLD-MCNC: 108 MG/DL (ref 70–99)
HDLC SERPL-MCNC: 68 MG/DL (ref 40–60)
LDL CHOLESTEROL CALCULATED: 128 MG/DL
MAGNESIUM: 2.1 MG/DL (ref 1.8–2.4)
POTASSIUM SERPL-SCNC: 4.6 MMOL/L (ref 3.5–5.1)
SODIUM BLD-SCNC: 139 MMOL/L (ref 136–145)
TRIGL SERPL-MCNC: 165 MG/DL (ref 0–150)
VITAMIN D 25-HYDROXY: 33.6 NG/ML
VLDLC SERPL CALC-MCNC: 33 MG/DL

## 2021-05-26 ENCOUNTER — OFFICE VISIT (OUTPATIENT)
Dept: ENT CLINIC | Age: 41
End: 2021-05-26
Payer: COMMERCIAL

## 2021-05-26 VITALS — SYSTOLIC BLOOD PRESSURE: 145 MMHG | DIASTOLIC BLOOD PRESSURE: 96 MMHG | TEMPERATURE: 97.6 F | HEART RATE: 77 BPM

## 2021-05-26 DIAGNOSIS — H61.21 IMPACTED CERUMEN OF RIGHT EAR: Primary | ICD-10-CM

## 2021-05-26 PROCEDURE — 69210 REMOVE IMPACTED EAR WAX UNI: CPT | Performed by: OTOLARYNGOLOGY

## 2021-05-26 NOTE — PROGRESS NOTES
Patient is following up for cerumen impaction. I last saw him on August 23, 2020. He has a prominent bony exostosis on the right ear which is causing the cerumen impaction to build up. He says that he feels like is a little bit plugged up today. About to go on vacation wants to have it cleaned    On the right side the patient had a bony exostosis that appears to be stable. He had cerumen adjacent to it that was mostly occluding his external auditory canal.  This was removed. Tympanic membrane appeared to be normal.  On the left side there was minimal wax. Tympanic membrane intact    Plan  Patient can follow-up every 6 months for cerumen removal.  If there is any evidence that the bony exostosis starts getting bigger could consider having it removed.

## 2021-09-29 DIAGNOSIS — K21.9 GASTROESOPHAGEAL REFLUX DISEASE WITHOUT ESOPHAGITIS: ICD-10-CM

## 2021-09-29 RX ORDER — ESOMEPRAZOLE MAGNESIUM 40 MG/1
40 CAPSULE, DELAYED RELEASE ORAL
Qty: 90 CAPSULE | Refills: 3 | Status: SHIPPED | OUTPATIENT
Start: 2021-09-29 | End: 2022-10-21 | Stop reason: SDUPTHER

## 2021-09-29 NOTE — TELEPHONE ENCOUNTER
Last appointment: 11/3/2020  Next appointment: Visit date not found  Last refill: 10/7/20  Sent [employer] message to schedule next appointment due in November.
Other Specify

## 2022-04-13 ENCOUNTER — OFFICE VISIT (OUTPATIENT)
Dept: ENT CLINIC | Age: 42
End: 2022-04-13
Payer: COMMERCIAL

## 2022-04-13 DIAGNOSIS — J01.90 ACUTE SINUSITIS, RECURRENCE NOT SPECIFIED, UNSPECIFIED LOCATION: Primary | ICD-10-CM

## 2022-04-13 DIAGNOSIS — J33.9 NASAL POLYPS: ICD-10-CM

## 2022-04-13 PROCEDURE — 99212 OFFICE O/P EST SF 10 MIN: CPT | Performed by: OTOLARYNGOLOGY

## 2022-04-13 RX ORDER — PREDNISONE 10 MG/1
TABLET ORAL
Qty: 30 TABLET | Refills: 0 | Status: SHIPPED | OUTPATIENT
Start: 2022-04-13

## 2022-04-13 RX ORDER — DOXYCYCLINE HYCLATE 100 MG
100 TABLET ORAL 2 TIMES DAILY
Qty: 28 TABLET | Refills: 0 | Status: SHIPPED | OUTPATIENT
Start: 2022-04-13 | End: 2022-04-27

## 2022-04-13 NOTE — PROGRESS NOTES
Patient presents for evaluation of some nasal issues. Couple weeks ago started feeling like he was get a little bit sick. Is tried over-the-counter medications such as Afrin for a few days as well as decongestants. Does not think that he has particularly bad allergies. The right side is worse than the left. Does have some drainage. Nasal endoscopy  The bilateral nasal cavity was examined using a rigid scope. On the left side the patient has fairly diffuse mucosal edema with some yellow-colored drainage. The middle turbinate is noted and is edematous, but no obvious polyps. On the right side he has what is possibly an edematous wide middle turbinate or a polyp that is obscuring the actual middle turbinate. There is some purulent drainage around it. He does have an inferior rightward septal deviation as well. Plan  Patient appears to have a sinusitis. He also may have polyps. He had 2 weeks of symptoms on 1 to treat him with antibiotics and a prednisone taper. I would like for him to do the Flonase as well. I will see him back in the office to take another look. If there is any evidence of ongoing sinusitis or polyps will probably evaluate the CT scan.

## 2022-06-27 NOTE — PROGRESS NOTES
AND FACE:  No abnormalities of the sin of face or head  EXTERNAL EARS AND NOSE:  Normal pinnae bilateral  FACIAL MUSCLES:  All branches of facial nerve intact  EXTRAOCULAR MUSCLES: Intact with full range of motion  FACE PALPATION:  No tenderness over sinuses. Zygomatic arches and orbital rims intact  OTOSCOPY: Cerumen occludes both external auditory canals. In addition there are exostoses of both external ear canals, the right is larger than the left  TUNING FORKS: Rinne ++ Treadwell midline at 512 Hz  INTRANASAL:  Septum midline, turbinates normal, meati clear. LIPS, TEETH, GINGIVA:  Normal mucosa  PHARYNX: Tongue mucosa appears normal today. NECK:  No masses. LYMPHATIC:  No cervical adenopathy  SALIVARY GLANDS:  No swelling or masses in the parotid or submandibular salivary glands  THYROID:  No goiter or thyroid masses. IMPRESSION: Impacted cerumen bilateral, glossitis. PLAN: Cerumen removed from both the ears using the microscope because of the exostoses. Tympanic membranes and middle ear spaces appear normal.  Ring is now improved. Minocycline oral irrigation prescribed for glossitis. FOLLOW-UP: As needed. 27-Jun-2022 08:24

## 2022-10-21 DIAGNOSIS — K21.9 GASTROESOPHAGEAL REFLUX DISEASE WITHOUT ESOPHAGITIS: ICD-10-CM

## 2022-10-23 RX ORDER — ESOMEPRAZOLE MAGNESIUM 40 MG/1
CAPSULE, DELAYED RELEASE ORAL
Qty: 90 CAPSULE | Refills: 3 | OUTPATIENT
Start: 2022-10-23

## 2022-10-23 RX ORDER — ESOMEPRAZOLE MAGNESIUM 40 MG/1
40 CAPSULE, DELAYED RELEASE ORAL
Qty: 90 CAPSULE | Refills: 0 | Status: SHIPPED | OUTPATIENT
Start: 2022-10-23

## 2023-01-18 ENCOUNTER — OFFICE VISIT (OUTPATIENT)
Dept: INTERNAL MEDICINE CLINIC | Age: 43
End: 2023-01-18
Payer: COMMERCIAL

## 2023-01-18 VITALS
SYSTOLIC BLOOD PRESSURE: 132 MMHG | HEART RATE: 93 BPM | WEIGHT: 193 LBS | HEIGHT: 69 IN | OXYGEN SATURATION: 97 % | BODY MASS INDEX: 28.58 KG/M2 | DIASTOLIC BLOOD PRESSURE: 68 MMHG

## 2023-01-18 DIAGNOSIS — Z12.11 SCREEN FOR COLON CANCER: ICD-10-CM

## 2023-01-18 DIAGNOSIS — Z79.899 DRUG THERAPY: ICD-10-CM

## 2023-01-18 DIAGNOSIS — Z11.59 NEED FOR HEPATITIS C SCREENING TEST: ICD-10-CM

## 2023-01-18 DIAGNOSIS — Z00.00 ANNUAL PHYSICAL EXAM: Primary | ICD-10-CM

## 2023-01-18 DIAGNOSIS — Z83.71 FAMILY HX COLONIC POLYPS: ICD-10-CM

## 2023-01-18 DIAGNOSIS — D22.9 MULTIPLE NEVI: ICD-10-CM

## 2023-01-18 DIAGNOSIS — Z80.0 FAMILY HISTORY OF COLON CANCER: ICD-10-CM

## 2023-01-18 DIAGNOSIS — K21.9 GASTROESOPHAGEAL REFLUX DISEASE WITHOUT ESOPHAGITIS: ICD-10-CM

## 2023-01-18 DIAGNOSIS — Z13.220 SCREENING FOR HYPERLIPIDEMIA: ICD-10-CM

## 2023-01-18 DIAGNOSIS — Z13.1 SCREENING FOR DIABETES MELLITUS: ICD-10-CM

## 2023-01-18 PROCEDURE — 99396 PREV VISIT EST AGE 40-64: CPT | Performed by: INTERNAL MEDICINE

## 2023-01-18 SDOH — ECONOMIC STABILITY: FOOD INSECURITY: WITHIN THE PAST 12 MONTHS, THE FOOD YOU BOUGHT JUST DIDN'T LAST AND YOU DIDN'T HAVE MONEY TO GET MORE.: NEVER TRUE

## 2023-01-18 SDOH — ECONOMIC STABILITY: FOOD INSECURITY: WITHIN THE PAST 12 MONTHS, YOU WORRIED THAT YOUR FOOD WOULD RUN OUT BEFORE YOU GOT MONEY TO BUY MORE.: NEVER TRUE

## 2023-01-18 ASSESSMENT — PATIENT HEALTH QUESTIONNAIRE - PHQ9
SUM OF ALL RESPONSES TO PHQ QUESTIONS 1-9: 0
1. LITTLE INTEREST OR PLEASURE IN DOING THINGS: 0
SUM OF ALL RESPONSES TO PHQ QUESTIONS 1-9: 0
SUM OF ALL RESPONSES TO PHQ9 QUESTIONS 1 & 2: 0
SUM OF ALL RESPONSES TO PHQ QUESTIONS 1-9: 0
SUM OF ALL RESPONSES TO PHQ QUESTIONS 1-9: 0
2. FEELING DOWN, DEPRESSED OR HOPELESS: 0

## 2023-01-18 ASSESSMENT — SOCIAL DETERMINANTS OF HEALTH (SDOH): HOW HARD IS IT FOR YOU TO PAY FOR THE VERY BASICS LIKE FOOD, HOUSING, MEDICAL CARE, AND HEATING?: NOT HARD AT ALL

## 2023-01-18 NOTE — PATIENT INSTRUCTIONS
Preventive plan of care for Spenser Mcneal        2023           Preventive Measures Status       Recommendations for screening   Prostate Cancer Screen  No results found for: PSA  Test is due at age 39    Colon Cancer Screen  Last colonoscopy: None Test is due at age 36 due to family history of colon polyps and colon cancer-referral provided   Diabetes Screen  Glucose (mg/dL)   Date Value   2020 108 (H)    Test recommended and ordered   Cholesterol Screen  Lab Results   Component Value Date    CHOL 229 (H) 2020    TRIG 165 (H) 2020    HDL 68 (H) 2020    [address] 128 (H) 2020    Test recommended and ordered   Hepatitis C screening: None Recommended for patients born between 1945 and 1965   HIV screenin2019 Recommended for patients between the ages of 15-65 who have never been tested regardless of risk   Aspirin for Cardiovascular Prevention   No Not indicated    Recommended Immunizations    Immunization History   Administered Date(s) Administered    COVID-19, MODERNA BLUE border, Primary or Immunocompromised, (age 12y+), IM, 100 mcg/0.5mL 2020, 2021, 10/26/2021    Influenza Virus Vaccine 10/22/2019, 2021    Influenza vaccine: recommended every fall     Pneumonia vaccine: Due at age 72    Shingles vaccine: Shingrix due at age 48    Tetanus vaccine: tetanus and diptheria/pertussis vaccine (Td/Tdap) recommended every 10 years- due               Additional Recommendations   1. Use Sunscreen daily when exposed to the sun to reduce the risk of skin cancer. 2. Continue a healthy lifestyle including a healthy diet and aerobic exercise  3. Always wear a seat belt  4. Always wear a helmet when riding a bike or motorcycle  5. Update eye exam every 2 years  6. Perform monthly self testicle checks-report any changes    Here are a few  Reliable websites with a variety of health and wellness information:     www.7writecheck. heart. org www.nutritionsource. org     www. americanheart. org     www. diabetes. org     www.[facility]     www.[employer].[facility])

## 2023-01-18 NOTE — PROGRESS NOTES
[facility]) Physicians  Internal Medicine  Patient Encounter  Kike Arellano D.O., [facility] Preventative Physical    Chief Complaint   Patient presents with    Follow-up       HPI-- 38 y.o. male presents today requesting his annual preventative history and physical      Medical/Surgical Histories     Past Medical History:   Diagnosis Date    Appendicitis 04/13/2019    Chicken pox     GERD (gastroesophageal reflux disease)     Seasonal allergies           Past Surgical History:   Procedure Laterality Date    LAPAROSCOPIC APPENDECTOMY N/A 04/13/2019    LAPAROSCOPIC APPENDECTOMY performed by Saloni Almeida MD at [place] [address]  2021    Dr. Kasey Calvo           Medications/Allergies     Current Outpatient Medications   Medication Sig Dispense Refill    esomeprazole (NEXIUM) 40 MG delayed release capsule Take 1 capsule by mouth every morning (before breakfast) 90 capsule 0    tretinoin (RETIN-A) 0.025 % cream APPLY A PEA SIZED AMOUNT EXTERNALLY TO FACE AT BEDTIME 20 g 3     No current facility-administered medications for this visit. No Known Allergies      Substance Use History     Social History     Tobacco Use    Smoking status: Never    Smokeless tobacco: Never    Tobacco comments:     will not start    Vaping Use    Vaping Use: Never used   Substance Use Topics    Alcohol use:  Yes     Alcohol/week: 5.0 standard drinks     Types: 3 Glasses of wine, 2 Shots of liquor per week    Drug use: Never          Family History     Family History   Problem Relation Age of Onset    High Blood Pressure Mother     High Blood Pressure Father     Colon Polyps Father     Lung Cancer Maternal Grandfather     Colon Cancer Paternal Grandmother     Brain Cancer Paternal Grandfather         Glioblastoma              REVIEW OF SYSTEMS:    CONSTITUTIONAL:  Neg   Recent weight changes, fatigue, fever, chills or night sweats, anorexia, Sleep difficulties  EYES: Neg  Blurry vision, loss of vision, double vision, tearing, itching, eye pain. EARS:  Neg Hearing loss, tinnitus, vertigo, discharge or otalgia. NOSE:  Neg  Rhinorrhea, sneezing, itching, allergy, epistaxis, or snoring  MOUTH/THROAT:  Neg Bleeding gums, hoarseness, sore throat, dysphagia, throat infections, or dentures  RESPIRATORY:  Neg SOB ,wheeze, cough, sputum, hemoptysis. No report of + TB test.    CARDIOVASCULAR:  Neg Chest pain, palpitations, heart murmur, dyspnea on exertion, orthopnea, paroxysmal nocturnal dyspnea or edema of extremities, or claudication  GASTROINTESTINAL:  Neg   Nausea, vomiting, or diarrhea, constipation,  hematemesis, heart burn, dysphagia,change in bowel movements or stool caliber, hematochezia, melena, abdominal pain, or food intolerance. Colonoscopy: No  GENITOURINARY:  Neg  Urinary frequency, hesitancy, urgency, polyuria, dysuria, hematuria, nocturia, incontinence, change in stream, genital pain or swelling, kidney stone  HEMATOLOGIC/LYMPHATIC:  Neg  Anemia, bleeding dyscrasias, easy bruising, blood clots (DVT/PE), transfusions, or enlarged lymph nodes  MUSCULOSKELETAL:  Neg  New myalgias, bone pain, joint pain, joint swelling, neck pain, radicular pain, or fractures.  + Low back ache occasionally. NEUROLOGICAL:  Neg  Loss of Consciousness, memeory loss or forgetfulness, confusion, difficulty concentrating, seizures, insomina, aphasia or dysarthria unilateral weakness or paresthesias, ataxia, headaches, syncope, tremor, or H/O head trauma. PSYCHIATRIC:  Neg  Depression, anxiety, personality changes, nervousness, drug or alcohol use/abuse, H/O psych counseling: No, Psychotropics: No  SKIN :  Neg  Rash, nail changes, sun burns, tattoos, change in moles, or skin color changes  ENDOCRINE:  Neg  Polydipsia,polyuria,abnormal weight changes,heat /cold intolerance, Hair changes. No H/O Diabetes or Thyroid disease.      Preventive Care:    Health Maintenance   Topic Date Due    Hepatitis C screen  Never done    Diabetes screen  Never done COVID-19 Vaccine (4 - Booster for Moderna series) 12/21/2021    DTaP/Tdap/Td vaccine (2 - Td or Tdap) 03/29/2023    Depression Screen  01/18/2024    Lipids  12/08/2025    Flu vaccine  Completed    HIV screen  Completed    Hepatitis A vaccine  Aged Out    Hib vaccine  Aged Out    Meningococcal (ACWY) vaccine  Aged Out    Pneumococcal 0-64 years Vaccine  Aged Out    Varicella vaccine  Discontinued      Self-testicular exams: Yes  Sexual activity: single partner, contraception - none   Last eye exam: 2019, normal, glasses  Exercise: no regular exercise  Seatbelt use: yes  Sunscreen Use: Yes      Physical Exam    Vitals:    01/18/23 1512   BP: 132/68   Pulse: 93   SpO2: 97%   Weight: 193 lb (87.5 kg)   Height: 5' 9\" (1.753 m)     Body mass index is 28.5 kg/m². Wt Readings from Last 3 Encounters:   01/18/23 193 lb (87.5 kg)   11/03/20 190 lb (86.2 kg)   08/20/20 188 lb (85.3 kg)     BP Readings from Last 3 Encounters:   01/18/23 132/68   05/26/21 (!) 145/96   11/03/20 120/66            GEN:  38 y.o. male who is in NAD, A&O. He appears stated age and well nourished. He is cooperative and pleasant. HEAD:  NC/AT, no lesions. EYES:  ALISA, EOMI, No scleral icterus or conjunctival injection or discharge. Visual fields in tact to confrontation. Fundoscopic (non-dilated) grossly normal.  Disc margins well demarcated. EARS:  EAC's clear, TM's normal.  NOSE:  Nasal cavity is clear. No mucosal congestion or discharge. Sinuses are nontender. MOUTH & THROAT:  Oral cavity is clear without mucosal lesions. Tongue is midline. Dentition is in good repair. No pharyngeal erythema or exudate. NECK:  Supple. Full ROM. Trachea is midline. No increased JVD. No thyromegaly or nodules. No masses  LYMPH: No C/SC/A/F nodes  CARDIAC:  S1S2 NL. Regular rhythm. No murmur/clicks/rubs. No ectopy. PMI is non-displaced. VASC:  Pedal pulses 2/4. Carotid upstrokes 2+. No bruits noted. PULM:  Lungs are CTA.   Symmetric breath sounds noted. AP Diameter NL. GI:  Abdomen is soft and nontender. No distension. No organomegaly. No masses. No pulsatile masses. : Deferred at this time  BREAST:  Deferred at this time  EXT:  No Cyanosis or clubbing. No edema. SKIN: Warm and dry, normal turgor, no rash or lesions of concern. NEURO:  Cranial nerves 2-12 are NL. Speech fluent and coherent. Strength is 5/5 in all muscle groups. No sensory deficits. No focal or lateralizing deficits. Reflexes 2/4 and symmetric. Gait is normal.  MS:  No C/T/L paraspinal tenderness. No scoliosis. No joint effusions. Full joint ROM. PSYCH:  Mood and affect NL. Judgement and insight NL.      ASSESSMENT/PLAN:    1. Annual physical exam  All care gaps identified and addressed  We discussed colon cancer screening due to family history of colon cancer and colon polyps  Strongly recommended COVID-19 vaccine updated booster  - CBC with Auto Differential; Future  - Comprehensive Metabolic Panel; Future  - Magnesium; Future  - Lipid Panel; Future  - Vitamin D 25 Hydroxy; Future  - TSH; Future  - Hepatitis C Antibody; Future    2. Screening for diabetes mellitus    - Comprehensive Metabolic Panel; Future    3. Screening for hyperlipidemia    - Lipid Panel; Future    4. Screen for colon cancer  Colonoscopy  - [facility] January DO, Gastroenterology, [facility]    5. Family history of colon cancer  Colonoscopy  - [facility] January DO, Gastroenterology, [facility]    6. Family hx colonic polyps  Colonoscopy  - [facility] January DO, [facility], [facility]    7. Gastroesophageal reflux disease without esophagitis  Patient is on Nexium chronically  Patient may benefit from an upper endoscopy as well. - [facility] January, DO, Gastroenterology, [facility]  - Magnesium; Future  - Vitamin D 25 Hydroxy; Future    8.  Need for hepatitis C screening test    - Hepatitis C Antibody; Future    9. Drug therapy  Patient on PPI therapy  Check magnesium and vitamin D level  Check renal function  - Magnesium; Future  - Vitamin D 25 Hydroxy; Future    10. Multiple nevi  Referral to dermatology  - Jake Story MD, Dermatology, [facility]           Preventive plan of care for Tristin Ugarte        2023           Preventive Measures Status       Recommendations for screening   Prostate Cancer Screen  No results found for: PSA  Test is due at age 39    Colon Cancer Screen  Last colonoscopy: None Test is due at age 36 due to family history of colon polyps and colon cancer-referral provided   Diabetes Screen  Glucose (mg/dL)   Date Value   2020 108 (H)    Test recommended and ordered   Cholesterol Screen  Lab Results   Component Value Date    CHOL 229 (H) 2020    TRIG 165 (H) 2020    HDL 68 (H) 2020    [facility] 128 (H) 2020    Test recommended and ordered   Hepatitis C screening: None Recommended for patients born between 1945 and 1965   HIV screenin2019 Recommended for patients between the ages of 15-65 who have never been tested regardless of risk   Aspirin for Cardiovascular Prevention   No Not indicated    Recommended Immunizations    Immunization History   Administered Date(s) Administered    COVID-19, MODERNA BLUE border, Primary or Immunocompromised, (age 12y+), IM, 100 mcg/0.5mL 2020, 2021, 10/26/2021    Influenza Virus Vaccine 10/22/2019, 2021    Influenza vaccine: recommended every fall     Pneumonia vaccine: Due at age 72    Shingles vaccine: Shingrix due at age 48    Tetanus vaccine: tetanus and diptheria/pertussis vaccine (Td/Tdap) recommended every 10 years- due               Additional Recommendations   1. Use Sunscreen daily when exposed to the sun to reduce the risk of skin cancer. 2. Continue a healthy lifestyle including a healthy diet and aerobic exercise  3. Always wear a seat belt  4.  Always wear a helmet when riding a bike or motorcycle  5. Update eye exam every 2 years  6. Perform monthly self testicle checks-report any changes    Here are a few  Reliable websites with a variety of health and wellness information:     www.[employer]. heart. org     www.nutritionsource. org     www. americanheart. org     www. diabetes. org     www.[facility]     www.[employer] [facility] site)

## 2023-01-25 DIAGNOSIS — K21.9 GASTROESOPHAGEAL REFLUX DISEASE WITHOUT ESOPHAGITIS: ICD-10-CM

## 2023-01-25 RX ORDER — ESOMEPRAZOLE MAGNESIUM 40 MG/1
CAPSULE, DELAYED RELEASE ORAL
Qty: 90 CAPSULE | Refills: 3 | Status: SHIPPED | OUTPATIENT
Start: 2023-01-25

## 2023-02-16 ENCOUNTER — TELEPHONE (OUTPATIENT)
Dept: DERMATOLOGY | Age: 43
End: 2023-02-16

## 2023-02-16 NOTE — TELEPHONE ENCOUNTER
Dr Sharon Lemons received a message stating that patient was in need of an appointment. When he called to schedule the  did not schedule him due to his LOV being in 2019. Dr Sharon Lemons agreeable to see patient again. Called and left message for patient to call back office.

## 2023-03-07 ENCOUNTER — OFFICE VISIT (OUTPATIENT)
Dept: DERMATOLOGY | Age: 43
End: 2023-03-07
Payer: COMMERCIAL

## 2023-03-07 DIAGNOSIS — D22.9 MULTIPLE NEVI: Primary | ICD-10-CM

## 2023-03-07 DIAGNOSIS — L81.4 SOLAR LENTIGINOSIS: ICD-10-CM

## 2023-03-07 DIAGNOSIS — L73.8 SEBACEOUS GLAND HYPERPLASIA OF FACE: ICD-10-CM

## 2023-03-07 PROCEDURE — 99213 OFFICE O/P EST LOW 20 MIN: CPT | Performed by: DERMATOLOGY

## 2023-03-07 NOTE — PROGRESS NOTES
On license of [facility] Dermatology  Tresia Lesch, MD  [phone]      Mae Fowler MD  1980    41 y.o. male     Date of Visit: 3/7/2023    Chief Complaint: skin moles    History of Present Illness:    1. He presents today for evaluation of multiple moles - not aware of any changes in size, color or shape. 2.  He has several stable asymptomatic lesions on the face. 3.  He has stable freckling on the face and shoulders. Likes to golf. Wears sunscreen. Review of Systems:  Gen: Feels well, good sense of health. Past Medical History, Family History, Surgical History, Medications and Allergies reviewed. Past Medical History:   Diagnosis Date    Appendicitis 04/13/2019    Chicken pox     GERD (gastroesophageal reflux disease)     Seasonal allergies      Past Surgical History:   Procedure Laterality Date    LAPAROSCOPIC APPENDECTOMY N/A 04/13/2019    LAPAROSCOPIC APPENDECTOMY performed by Darleen Ferrer MD at [place] [address]  2021    Dr. 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  His wife is a nurse practitioner in general surgery in [place]. He has 3 kids and lives in [place]; 15, 6 and 4. Physical Examination       The following were examined and determined to be normal: Psych/Neuro, Scalp/hair, Head/face, Conjunctivae/eyelids, Gums/teeth/lips, Neck, Breast/axilla/chest, Abdomen, Back, RUE, LUE, RLE, LLE, and Nails/digits. The following were examined and determined to be abnormal: None. Well appearing.     1.  Trunk and extremities with multiple well defined round to oval smooth brown macules and papules. 2.  Sides of the face with several umbilicated yellowish pink papules. 3.  Sides of the face and shoulders with several smooth light brown macules and small patches. Assessment and Plan     1. Multiple nevi - benign appearing    Sun protective behaviors, including use of at least SPF 30 sunscreen, and self skin examinations were encouraged. Call for any new or concerning lesions. 2. Sebaceous gland hyperplasia of face     Reassurance. 3. Solar lentiginosis     Monitor for change. Sun protective behaviors encouraged including use of at least SPF 30 or more sunscreen. Tretinoin 0.025% cream nightly at bedtime. Return in about 1 year (around 3/7/2024).     --Zayda Avelar MD

## 2023-05-26 ENCOUNTER — HOSPITAL ENCOUNTER (EMERGENCY)
Age: 43
Discharge: HOME | End: 2023-05-26
Attending: EMERGENCY MEDICINE
Payer: COMMERCIAL
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
## 2023-05-26 DIAGNOSIS — S61.258A DOG BITE OF MIDDLE FINGER, INITIAL ENCOUNTER: ICD-10-CM

## 2023-05-26 DIAGNOSIS — S61.212A LACERATION OF RIGHT MIDDLE FINGER WITHOUT FOREIGN BODY WITHOUT DAMAGE TO NAIL, INITIAL ENCOUNTER: Primary | ICD-10-CM

## 2023-05-26 PROCEDURE — 12001 RPR S/N/AX/GEN/TRNK 2.5CM/<: CPT

## 2023-05-26 PROCEDURE — 90715 TDAP VACCINE 7 YRS/> IM: CPT | Performed by: EMERGENCY MEDICINE

## 2023-05-26 PROCEDURE — 90471 IMMUNIZATION ADMIN: CPT | Performed by: EMERGENCY MEDICINE

## 2023-05-26 PROCEDURE — 6370000000 HC RX 637 (ALT 250 FOR IP): Performed by: EMERGENCY MEDICINE

## 2023-05-26 PROCEDURE — 6360000002 HC RX W HCPCS: Performed by: EMERGENCY MEDICINE

## 2023-05-26 PROCEDURE — 99284 EMERGENCY DEPT VISIT MOD MDM: CPT

## 2023-05-26 RX ORDER — AMOXICILLIN AND CLAVULANATE POTASSIUM 875; 125 MG/1; MG/1
1 TABLET, FILM COATED ORAL EVERY 12 HOURS SCHEDULED
Status: DISCONTINUED | OUTPATIENT
Start: 2023-05-26 | End: 2023-05-26 | Stop reason: HOSPADM

## 2023-05-26 RX ORDER — AMOXICILLIN AND CLAVULANATE POTASSIUM 875; 125 MG/1; MG/1
1 TABLET, FILM COATED ORAL 2 TIMES DAILY
Qty: 20 TABLET | Refills: 0 | Status: SHIPPED | OUTPATIENT
Start: 2023-05-26 | End: 2023-06-05

## 2023-05-26 RX ADMIN — AMOXICILLIN AND CLAVULANATE POTASSIUM 1 TABLET: 875; 125 TABLET, FILM COATED ORAL at 21:33

## 2023-05-26 RX ADMIN — TETANUS TOXOID, REDUCED DIPHTHERIA TOXOID AND ACELLULAR PERTUSSIS VACCINE, ADSORBED 0.5 ML: 5; 2.5; 8; 8; 2.5 SUSPENSION INTRAMUSCULAR at 21:35

## 2023-05-26 ASSESSMENT — PAIN - FUNCTIONAL ASSESSMENT: PAIN_FUNCTIONAL_ASSESSMENT: 0-10

## 2023-05-26 ASSESSMENT — PAIN SCALES - GENERAL: PAINLEVEL_OUTOF10: 2

## 2023-05-27 NOTE — ED PROVIDER NOTES
[address] [place] [address]      Pt Name: Tatiana Setvens MD  MRN: [id number]  Maximiliangfhalina 1980  Date of evaluation: 5/26/2023  Provider: Yadi Briggs [address] [place] [address]  Chief Complaint   Patient presents with    Laceration     Laceration to right middle finger. Pt states puppy bit finger. Alert and oriented x4         This patient is at risk for a communicable infection. Therefore, personal protection equipment consisting of a mask was worn for the exam.    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 It occurred just prior to arrival.  Denies any paresthesias or weakness. It does not hurt when he moves it. He denies any other injuries. REVIEW OF SYSTEMS  All systems negative or noncontributory    No LMP for male patient. PAST MEDICAL HISTORY  Past Medical History:   Diagnosis Date    Appendicitis 04/13/2019    Chicken pox     GERD (gastroesophageal reflux disease)     Seasonal allergies        FAMILY HISTORY  Family History   Problem Relation Age of Onset    High Blood Pressure Mother     High Blood Pressure Father     Colon Polyps Father     Lung Cancer Maternal Grandfather     Colon Cancer Paternal Grandmother     Brain Cancer Paternal Grandfather         Glioblastoma       SOCIAL HISTORY   reports that he has never smoked. He has never used smokeless tobacco. He reports current alcohol use of about 5.0 standard drinks per week. He reports that he does not use drugs.     SURGICAL HISTORY  Past Surgical History:   Procedure Laterality Date    LAPAROSCOPIC APPENDECTOMY N/A 04/13/2019    LAPAROSCOPIC APPENDECTOMY performed by Jn Pedersen MD at [place] [address]  2021    Dr. Deandre Whatley  Current Outpatient Rx   Medication Sig Dispense Refill    amoxicillin-clavulanate (AUGMENTIN) 875-125 MG per tablet Take 1 tablet

## 2024-01-25 DIAGNOSIS — K21.9 GASTROESOPHAGEAL REFLUX DISEASE WITHOUT ESOPHAGITIS: ICD-10-CM

## 2024-01-25 RX ORDER — ESOMEPRAZOLE MAGNESIUM 40 MG/1
40 CAPSULE, DELAYED RELEASE ORAL
Qty: 90 CAPSULE | Refills: 3 | Status: SHIPPED | OUTPATIENT
Start: 2024-01-25

## 2024-03-12 ENCOUNTER — OFFICE VISIT (OUTPATIENT)
Dept: DERMATOLOGY | Age: 44
End: 2024-03-12
Payer: COMMERCIAL

## 2024-03-12 DIAGNOSIS — L81.4 SOLAR LENTIGINOSIS: ICD-10-CM

## 2024-03-12 DIAGNOSIS — D22.9 MULTIPLE NEVI: Primary | ICD-10-CM

## 2024-03-12 PROCEDURE — 99213 OFFICE O/P EST LOW 20 MIN: CPT | Performed by: DERMATOLOGY

## 2024-03-12 NOTE — PROGRESS NOTES
[facility] Dermatology  Varinder Lozano MD  [phone]      Varinder Barney MD  1980    44 y.o. male     Date of Visit: 3/12/2024    Chief Complaint: skin moles    History of Present Illness:    1.  He presents today for evaluation of multiple moles on the trunk and extremities-not aware of any changes in size, color, or shape.    2.  He has stable freckling on the upper extremities.    Likes to golf.   Wears sunscreen.    Review of Systems:  Gen: Feels well, good sense of health.    Past Medical History, Family History, Surgical History, Medications and Allergies reviewed.    Past Medical History:   Diagnosis Date    Appendicitis 04/13/2019    Chicken pox     GERD (gastroesophageal reflux disease)     Seasonal allergies      Past Surgical History:   Procedure Laterality Date    LAPAROSCOPIC APPENDECTOMY N/A 04/13/2019    LAPAROSCOPIC APPENDECTOMY performed by Cornelio Jones MD at [facility] OR    VASECTOMY  2021    Dr. Aguirre       No Known Allergies  Outpatient Medications Marked as Taking for the 3/12/24 encounter (Office Visit) with Varinder Lozano MD   Medication Sig Dispense Refill    esomeprazole (NEXIUM) 40 MG delayed release capsule Take 1 capsule by mouth every morning (before breakfast) 90 capsule 3    tretinoin (RETIN-A) 0.025 % cream APPLY A PEA SIZED AMOUNT EXTERNALLY TO FACE AT BEDTIME 20 g 3     He is a general surgeon, now at [employer].    Physical Examination       The following were examined and determined to be normal: Psych/Neuro, Scalp/hair, Head/face, Conjunctivae/eyelids, Gums/teeth/lips, Neck, Breast/axilla/chest, Abdomen, Back, RUE, LUE, RLE, LLE, and Nails/digits.    The following were examined and determined to be abnormal: None.     Well appearing.    1.  Trunk and extremities with multiple well defined round to oval smooth brown macules and papules.     2.  Shoulders and upper extremities with multiple round smooth light brown macules and

## 2024-04-06 ASSESSMENT — PATIENT HEALTH QUESTIONNAIRE - PHQ9
2. FEELING DOWN, DEPRESSED OR HOPELESS: NOT AT ALL
SUM OF ALL RESPONSES TO PHQ QUESTIONS 1-9: 0
2. FEELING DOWN, DEPRESSED OR HOPELESS: NOT AT ALL
SUM OF ALL RESPONSES TO PHQ QUESTIONS 1-9: 0
SUM OF ALL RESPONSES TO PHQ9 QUESTIONS 1 & 2: 0
1. LITTLE INTEREST OR PLEASURE IN DOING THINGS: NOT AT ALL
SUM OF ALL RESPONSES TO PHQ9 QUESTIONS 1 & 2: 0
SUM OF ALL RESPONSES TO PHQ QUESTIONS 1-9: 0
SUM OF ALL RESPONSES TO PHQ QUESTIONS 1-9: 0
1. LITTLE INTEREST OR PLEASURE IN DOING THINGS: NOT AT ALL

## 2024-04-09 ENCOUNTER — OFFICE VISIT (OUTPATIENT)
Dept: INTERNAL MEDICINE CLINIC | Age: 44
End: 2024-04-09
Payer: COMMERCIAL

## 2024-04-09 VITALS
HEIGHT: 69 IN | WEIGHT: 198 LBS | HEART RATE: 78 BPM | DIASTOLIC BLOOD PRESSURE: 80 MMHG | SYSTOLIC BLOOD PRESSURE: 136 MMHG | OXYGEN SATURATION: 99 % | BODY MASS INDEX: 29.33 KG/M2 | TEMPERATURE: 97.7 F

## 2024-04-09 DIAGNOSIS — D22.9 MULTIPLE NEVI: ICD-10-CM

## 2024-04-09 DIAGNOSIS — E55.9 VITAMIN D DEFICIENCY: ICD-10-CM

## 2024-04-09 DIAGNOSIS — Z01.84 IMMUNITY STATUS TESTING: ICD-10-CM

## 2024-04-09 DIAGNOSIS — H61.21 CERUMINOSIS, RIGHT: ICD-10-CM

## 2024-04-09 DIAGNOSIS — Z00.00 ANNUAL PHYSICAL EXAM: Primary | ICD-10-CM

## 2024-04-09 DIAGNOSIS — E78.5 HYPERLIPIDEMIA, UNSPECIFIED HYPERLIPIDEMIA TYPE: ICD-10-CM

## 2024-04-09 DIAGNOSIS — Z13.1 SCREENING FOR DIABETES MELLITUS: ICD-10-CM

## 2024-04-09 PROCEDURE — 99396 PREV VISIT EST AGE 40-64: CPT | Performed by: INTERNAL MEDICINE

## 2024-04-09 RX ORDER — LEVOCETIRIZINE DIHYDROCHLORIDE 5 MG/1
5 TABLET, FILM COATED ORAL NIGHTLY
COMMUNITY

## 2024-04-09 SDOH — ECONOMIC STABILITY: FOOD INSECURITY: WITHIN THE PAST 12 MONTHS, YOU WORRIED THAT YOUR FOOD WOULD RUN OUT BEFORE YOU GOT MONEY TO BUY MORE.: NEVER TRUE

## 2024-04-09 SDOH — ECONOMIC STABILITY: FOOD INSECURITY: WITHIN THE PAST 12 MONTHS, THE FOOD YOU BOUGHT JUST DIDN'T LAST AND YOU DIDN'T HAVE MONEY TO GET MORE.: NEVER TRUE

## 2024-04-09 SDOH — ECONOMIC STABILITY: HOUSING INSECURITY
IN THE LAST 12 MONTHS, WAS THERE A TIME WHEN YOU DID NOT HAVE A STEADY PLACE TO SLEEP OR SLEPT IN A SHELTER (INCLUDING NOW)?: NO

## 2024-04-09 SDOH — ECONOMIC STABILITY: INCOME INSECURITY: HOW HARD IS IT FOR YOU TO PAY FOR THE VERY BASICS LIKE FOOD, HOUSING, MEDICAL CARE, AND HEATING?: NOT HARD AT ALL

## 2024-04-09 NOTE — PATIENT INSTRUCTIONS
Preventive plan of care for Varinder Barney MD        2024           Preventive Measures Status       Recommendations for screening   Prostate Cancer Screen  No results found for: \"PSA\"  Test is due at age 45    Colon Cancer Screen  Last colonoscopy: None Scheduled for 2024.     Diabetes Screen  Glucose (mg/dL)   Date Value   2023 88    Test recommended and ordered   Cholesterol Screen  Lab Results   Component Value Date    CHOL 223 (H) 2023    TRIG 77 2023    HDL 53 2023    LDLCALC 155 (H) 2023    Test recommended and ordered   Hepatitis C screenin2023 Recommended for patients born between 1945 and 1965-- No need to repeat   HIV screenin2019 Recommended for patients between the ages of 15-65 who have never been tested regardless of risk-- No need to repeat   Aspirin for Cardiovascular Prevention   No Not indicated    Recommended Immunizations    Immunization History   Administered Date(s) Administered    COVID-19, MODERNA BLUE border, Primary or Immunocompromised, (age 12y+), IM, 100 mcg/0.5mL 2020, 2021, 10/26/2021    Influenza Virus Vaccine 10/22/2019, 2021    TDaP, ADACEL (age 10y-64y), BOOSTRIX (age 10y+), IM, 0.5mL 2013, 2023    Influenza vaccine: recommended every fall     Pneumonia vaccine: Due at age 65    Shingles vaccine: Shingrix due at age 50    Tetanus vaccine: tetanus and diptheria/pertussis vaccine (Td/Tdap) recommended every 10 years- due     COVID-19 vaccine-- recommended            Additional Recommendations   1. Use Sunscreen daily when exposed to the sun to reduce the risk of skin cancer.    2. Continue a healthy lifestyle including a healthy diet and aerobic exercise  3. Always wear a seat belt  4. Always wear a helmet when riding a bike or motorcycle  5. Update eye exam every 2 years  6. Perform monthly self testicle checks-report any changes    Here are a few  Reliable websites with a variety of health

## 2024-04-09 NOTE — PROGRESS NOTES
[facility] Physicians  Internal Medicine  Patient Encounter  Clifford Kate D.O., [facility]        Annual Preventative Physical    Chief Complaint   Patient presents with    Annual Exam     Patient presents to clinic for annual physical. Denies any new concerns or c/o.        HPI-- 44 y.o. male presents today requesting his annual preventative history and physical        Medical/Surgical Histories     Past Medical History:   Diagnosis Date    Appendicitis 04/13/2019    Chicken pox     GERD (gastroesophageal reflux disease)     Hyperlipidemia 4/9/2024    Seasonal allergies           Past Surgical History:   Procedure Laterality Date    LAPAROSCOPIC APPENDECTOMY N/A 04/13/2019    LAPAROSCOPIC APPENDECTOMY performed by Cornelio Jones MD at [facility] OR    VASECTOMY  2021    Dr. Aguirre           Medications/Allergies     Current Outpatient Medications   Medication Sig Dispense Refill    esomeprazole (NEXIUM) 40 MG delayed release capsule Take 1 capsule by mouth every morning (before breakfast) 90 capsule 0    tretinoin (RETIN-A) 0.025 % cream APPLY A PEA SIZED AMOUNT EXTERNALLY TO FACE AT BEDTIME 20 g 3     No current facility-administered medications for this visit.        No Known Allergies      Substance Use History     Social History     Tobacco Use    Smoking status: Never    Smokeless tobacco: Never    Tobacco comments:     will not start    Vaping Use    Vaping Use: Never used   Substance Use Topics    Alcohol use: Yes     Alcohol/week: 5.0 standard drinks of alcohol     Types: 3 Glasses of wine, 2 Shots of liquor per week    Drug use: Never          Family History     Family History   Problem Relation Age of Onset    High Blood Pressure Mother     High Blood Pressure Father     Colon Polyps Father     Lung Cancer Maternal Grandfather     Colon Cancer Paternal Grandmother     Brain Cancer Paternal Grandfather         Glioblastoma    Colon Polyps Son         Juvenile polyposis    Leukemia Maternal Aunt

## 2024-04-30 DIAGNOSIS — E78.5 HYPERLIPIDEMIA, UNSPECIFIED HYPERLIPIDEMIA TYPE: ICD-10-CM

## 2024-04-30 DIAGNOSIS — Z00.00 ANNUAL PHYSICAL EXAM: ICD-10-CM

## 2024-04-30 DIAGNOSIS — E55.9 VITAMIN D DEFICIENCY: ICD-10-CM

## 2024-04-30 DIAGNOSIS — Z13.1 SCREENING FOR DIABETES MELLITUS: ICD-10-CM

## 2024-04-30 DIAGNOSIS — Z01.84 IMMUNITY STATUS TESTING: ICD-10-CM

## 2024-04-30 LAB
25(OH)D3 SERPL-MCNC: 23.4 NG/ML
ALBUMIN SERPL-MCNC: 4.9 G/DL (ref 3.4–5)
ALBUMIN/GLOB SERPL: 2.6 {RATIO} (ref 1.1–2.2)
ALP SERPL-CCNC: 67 U/L (ref 40–129)
ALT SERPL-CCNC: 16 U/L (ref 10–40)
ANION GAP SERPL CALCULATED.3IONS-SCNC: 10 MMOL/L (ref 3–16)
AST SERPL-CCNC: 18 U/L (ref 15–37)
BASOPHILS # BLD: 0 K/UL (ref 0–0.2)
BASOPHILS NFR BLD: 0.4 %
BILIRUB SERPL-MCNC: 0.5 MG/DL (ref 0–1)
BUN SERPL-MCNC: 9 MG/DL (ref 7–20)
CALCIUM SERPL-MCNC: 9.5 MG/DL (ref 8.3–10.6)
CHLORIDE SERPL-SCNC: 103 MMOL/L (ref 99–110)
CHOLEST SERPL-MCNC: 217 MG/DL (ref 0–199)
CO2 SERPL-SCNC: 27 MMOL/L (ref 21–32)
CREAT SERPL-MCNC: 0.8 MG/DL (ref 0.9–1.3)
DEPRECATED RDW RBC AUTO: 13.3 % (ref 12.4–15.4)
EOSINOPHIL # BLD: 0.1 K/UL (ref 0–0.6)
EOSINOPHIL NFR BLD: 2.6 %
GFR SERPLBLD CREATININE-BSD FMLA CKD-EPI: >90 ML/MIN/{1.73_M2}
GLUCOSE SERPL-MCNC: 94 MG/DL (ref 70–99)
HBV SURFACE AB SERPL IA-ACNC: 690.7 MIU/ML
HCT VFR BLD AUTO: 43.6 % (ref 40.5–52.5)
HDLC SERPL-MCNC: 56 MG/DL (ref 40–60)
HGB BLD-MCNC: 14.8 G/DL (ref 13.5–17.5)
LDLC SERPL CALC-MCNC: 135 MG/DL
LYMPHOCYTES # BLD: 1.7 K/UL (ref 1–5.1)
LYMPHOCYTES NFR BLD: 34.2 %
MCH RBC QN AUTO: 30.9 PG (ref 26–34)
MCHC RBC AUTO-ENTMCNC: 34 G/DL (ref 31–36)
MCV RBC AUTO: 90.9 FL (ref 80–100)
MONOCYTES # BLD: 0.4 K/UL (ref 0–1.3)
MONOCYTES NFR BLD: 7.4 %
NEUTROPHILS # BLD: 2.7 K/UL (ref 1.7–7.7)
NEUTROPHILS NFR BLD: 55.4 %
PLATELET # BLD AUTO: 183 K/UL (ref 135–450)
PMV BLD AUTO: 10.1 FL (ref 5–10.5)
POTASSIUM SERPL-SCNC: 4.6 MMOL/L (ref 3.5–5.1)
PROT SERPL-MCNC: 6.8 G/DL (ref 6.4–8.2)
RBC # BLD AUTO: 4.8 M/UL (ref 4.2–5.9)
SODIUM SERPL-SCNC: 140 MMOL/L (ref 136–145)
TRIGL SERPL-MCNC: 129 MG/DL (ref 0–150)
TSH SERPL DL<=0.005 MIU/L-ACNC: 1.29 UIU/ML (ref 0.27–4.2)
VLDLC SERPL CALC-MCNC: 26 MG/DL
WBC # BLD AUTO: 4.9 K/UL (ref 4–11)

## 2024-06-11 NOTE — PROGRESS NOTES
[facility] Pre-Admission Testing Electronic Communication Worksheet for OR/ENDO Procedures        Patient: Varinder Barney MD    DOS: 6/21    Transportation Confirmed: [x] YES    []  NO    History and Physical:  [] YES    []  NO  [x] N/A  If yes, please list doctor or Urgent Care and date of H&P:     Additional Clearance(Cardiac, Pulmonary, etc):  [] YES    [x]  NO    Pre-Admission Testing Visit:  [] YES    [x]  NO If no, do labs/testing need to be done DOS?  [] YES    [x]  NO    Medication Reconciliation Complete:  [x] YES    []  NO        Additional Notes:                Interview Complete: [x] YES    []  NO          Betty Guillen RN  9:22 AM

## 2024-06-11 NOTE — PROGRESS NOTES
Covid testing to be done:    If positive---Pt instructed to notify MD ASAP  If negative--pt was instructed to bring Hard copy of Covid results DOP/DOS    Preoperative Screening for Elective Surgery/Invasive Procedures While COVID-19 present in the community     Have you tested positive or have been told to self-isolate for COVID-19 like symptoms within the past 28 days? NO  Do you currently have any of the following symptoms?  Fever >100.0 F or 99.9 F in immunocompromised patients? NO  New onset cough, shortness of breath or difficulty breathing? NO  New onset sore throat, myalgia (muscle aches and pains), headache, loss of taste/smell or diarrhea? NO  Have you had a potential exposure to COVID-19 within the past 14 days by:  Close contact with a confirmed case? NO  Close contact with a healthcare worker,  or essential infrastructure worker (grocery store, restaurant, gas station, public utilities or transportation)? YES  Do you reside in a congregate setting such as; skilled nursing facility, adult home, correctional facility, homeless shelter or other institutional setting? NO  Have you had recent travel to a known COVID-19 hotspot? NO     Indicate if the patient has a positive screen by answering yes to one or more of the above questions.    If patient is unable to obtain a COVID test prior to DOS/DOP will need RAPID DOS.C-Difficile admission screening and protocol:       * Admitted with diarrhea?                         [] YES    [x]  NO     *Prior history of C-Diff. In last 3 months? [] YES    [x]  NO     *Antibiotic use in the past 6-8 weeks?      [x]  NO    []  YES      If yes, which: REASON_________________     *Prior hospitalization or nursing home in the last month? []  YES    [x]  NO     SAFETY FIRST..call before you fall    [facility] PRE-OPERATIVE INSTRUCTIONS    Day of Surgery:   6/21              Arrival time_1030___________          Surgery time_1200___________    Do not eat  removed before going to operating room.    For your convenience, we will provide you with a container.    If you wear contact lenses or glasses, they will be removed, please bring a case for them.     If you have a living will and a durable power of  for healthcare, please bring in a copy.     As part of our patient safety program to minimize surgical site infections, we ask you to do the following:    Please notify your surgeon if you develop any illness between         now and the day of your surgery.    This includes a cough, cold, fever, sore throat, nausea,         or vomiting, and diarrhea, etc.   Please notify your surgeon if you experience dizziness, shortness         of breath or blurred vision between now and the time of your surgery.      Do not shave your operative site 96 hours prior to surgery.   For face and neck surgery, men may use an electric razor 48 hours   prior to surgery.    You may shower the night before surgery or the morning of   your surgery with an antibacterial soap.    You will need to bring a photo ID and insurance card     If you use a C-pap or Bi-pap machine, please bring your machine with you to the hospital     Our goal is to provide you with excellent care, therefore, visitors will be limited to so that we may focus on providing this care for you.          Please contact your surgeon office, if you have any further questions.                 [facility] phone number:  [phone]     [facility] fax number:  [phone]    Please note these are generalized instructions for all surgical cases, you may be provided with more specific instructions according to your surgery.

## 2024-06-20 ENCOUNTER — ANESTHESIA EVENT (OUTPATIENT)
Dept: ENDOSCOPY | Age: 44
End: 2024-06-20
Payer: COMMERCIAL

## 2024-06-21 ENCOUNTER — HOSPITAL ENCOUNTER (OUTPATIENT)
Age: 44
Setting detail: OUTPATIENT SURGERY
Discharge: HOME | End: 2024-06-21
Attending: INTERNAL MEDICINE | Admitting: INTERNAL MEDICINE
Payer: COMMERCIAL

## 2024-06-21 ENCOUNTER — ANESTHESIA (OUTPATIENT)
Dept: ENDOSCOPY | Age: 44
End: 2024-06-21
Payer: COMMERCIAL

## 2024-06-21 VITALS
OXYGEN SATURATION: 99 % | HEIGHT: 69 IN | SYSTOLIC BLOOD PRESSURE: 162 MMHG | TEMPERATURE: 97 F | DIASTOLIC BLOOD PRESSURE: 106 MMHG | BODY MASS INDEX: 28.14 KG/M2 | WEIGHT: 190 LBS | HEART RATE: 81 BPM | RESPIRATION RATE: 16 BRPM

## 2024-06-21 DIAGNOSIS — K21.9 GASTROESOPHAGEAL REFLUX DISEASE, UNSPECIFIED WHETHER ESOPHAGITIS PRESENT: ICD-10-CM

## 2024-06-21 DIAGNOSIS — Z12.11 COLON CANCER SCREENING: ICD-10-CM

## 2024-06-21 PROCEDURE — 3700000001 HC ADD 15 MINUTES (ANESTHESIA): Performed by: INTERNAL MEDICINE

## 2024-06-21 PROCEDURE — 7100000011 HC PHASE II RECOVERY - ADDTL 15 MIN: Performed by: INTERNAL MEDICINE

## 2024-06-21 PROCEDURE — 2709999900 HC NON-CHARGEABLE SUPPLY: Performed by: INTERNAL MEDICINE

## 2024-06-21 PROCEDURE — 3609012400 HC EGD TRANSORAL BIOPSY SINGLE/MULTIPLE: Performed by: INTERNAL MEDICINE

## 2024-06-21 PROCEDURE — 88305 TISSUE EXAM BY PATHOLOGIST: CPT

## 2024-06-21 PROCEDURE — 7100000001 HC PACU RECOVERY - ADDTL 15 MIN: Performed by: INTERNAL MEDICINE

## 2024-06-21 PROCEDURE — 2580000003 HC RX 258: Performed by: ANESTHESIOLOGY

## 2024-06-21 PROCEDURE — 3609010600 HC COLONOSCOPY POLYPECTOMY SNARE/COLD BIOPSY: Performed by: INTERNAL MEDICINE

## 2024-06-21 PROCEDURE — 6360000002 HC RX W HCPCS

## 2024-06-21 PROCEDURE — 2500000003 HC RX 250 WO HCPCS

## 2024-06-21 PROCEDURE — 7100000000 HC PACU RECOVERY - FIRST 15 MIN: Performed by: INTERNAL MEDICINE

## 2024-06-21 PROCEDURE — 3700000000 HC ANESTHESIA ATTENDED CARE: Performed by: INTERNAL MEDICINE

## 2024-06-21 PROCEDURE — 7100000010 HC PHASE II RECOVERY - FIRST 15 MIN: Performed by: INTERNAL MEDICINE

## 2024-06-21 RX ORDER — SODIUM CHLORIDE 9 MG/ML
INJECTION, SOLUTION INTRAVENOUS PRN
Status: DISCONTINUED | OUTPATIENT
Start: 2024-06-21 | End: 2024-06-21 | Stop reason: HOSPADM

## 2024-06-21 RX ORDER — DIPHENHYDRAMINE HYDROCHLORIDE 50 MG/ML
12.5 INJECTION INTRAMUSCULAR; INTRAVENOUS
Status: DISCONTINUED | OUTPATIENT
Start: 2024-06-21 | End: 2024-06-21 | Stop reason: HOSPADM

## 2024-06-21 RX ORDER — LIDOCAINE HYDROCHLORIDE 20 MG/ML
INJECTION, SOLUTION EPIDURAL; INFILTRATION; INTRACAUDAL; PERINEURAL PRN
Status: DISCONTINUED | OUTPATIENT
Start: 2024-06-21 | End: 2024-06-21 | Stop reason: SDUPTHER

## 2024-06-21 RX ORDER — ONDANSETRON 2 MG/ML
4 INJECTION INTRAMUSCULAR; INTRAVENOUS
Status: DISCONTINUED | OUTPATIENT
Start: 2024-06-21 | End: 2024-06-21 | Stop reason: HOSPADM

## 2024-06-21 RX ORDER — SODIUM CHLORIDE 0.9 % (FLUSH) 0.9 %
5-40 SYRINGE (ML) INJECTION PRN
Status: DISCONTINUED | OUTPATIENT
Start: 2024-06-21 | End: 2024-06-21 | Stop reason: HOSPADM

## 2024-06-21 RX ORDER — SODIUM CHLORIDE 0.9 % (FLUSH) 0.9 %
5-40 SYRINGE (ML) INJECTION EVERY 12 HOURS SCHEDULED
Status: DISCONTINUED | OUTPATIENT
Start: 2024-06-21 | End: 2024-06-21 | Stop reason: HOSPADM

## 2024-06-21 RX ORDER — PROPOFOL 10 MG/ML
INJECTION, EMULSION INTRAVENOUS PRN
Status: DISCONTINUED | OUTPATIENT
Start: 2024-06-21 | End: 2024-06-21 | Stop reason: SDUPTHER

## 2024-06-21 RX ORDER — NALOXONE HYDROCHLORIDE 0.4 MG/ML
INJECTION, SOLUTION INTRAMUSCULAR; INTRAVENOUS; SUBCUTANEOUS PRN
Status: DISCONTINUED | OUTPATIENT
Start: 2024-06-21 | End: 2024-06-21 | Stop reason: HOSPADM

## 2024-06-21 RX ADMIN — PROPOFOL 100 MG: 10 INJECTION, EMULSION INTRAVENOUS at 12:15

## 2024-06-21 RX ADMIN — PROPOFOL 50 MG: 10 INJECTION, EMULSION INTRAVENOUS at 12:18

## 2024-06-21 RX ADMIN — PROPOFOL 50 MG: 10 INJECTION, EMULSION INTRAVENOUS at 12:19

## 2024-06-21 RX ADMIN — SODIUM CHLORIDE: 9 INJECTION, SOLUTION INTRAVENOUS at 10:45

## 2024-06-21 RX ADMIN — PROPOFOL 50 MG: 10 INJECTION, EMULSION INTRAVENOUS at 12:17

## 2024-06-21 RX ADMIN — LIDOCAINE HYDROCHLORIDE 100 MG: 20 INJECTION, SOLUTION EPIDURAL; INFILTRATION; INTRACAUDAL; PERINEURAL at 12:15

## 2024-06-21 RX ADMIN — PROPOFOL 180 MCG/KG/MIN: 10 INJECTION, EMULSION INTRAVENOUS at 12:16

## 2024-06-21 ASSESSMENT — ENCOUNTER SYMPTOMS: SHORTNESS OF BREATH: 0

## 2024-06-21 ASSESSMENT — LIFESTYLE VARIABLES: SMOKING_STATUS: 0

## 2024-06-21 ASSESSMENT — PAIN - FUNCTIONAL ASSESSMENT
PAIN_FUNCTIONAL_ASSESSMENT: NONE - DENIES PAIN
PAIN_FUNCTIONAL_ASSESSMENT: 0-10

## 2024-06-21 NOTE — DISCHARGE INSTRUCTIONS
Recommendations:   1) The patient had biopsies taken today.  The patient should call for results in 7 days if they have not heard from our office.  Our number is [phone]  2) Continue the Nexium 40mg daily  3) The patient had colon polyp(s) successfully removed today. Theres is a small risk for these sites to bleed for up to several weeks out from the procedure. The patient is instructed to call if there is any significant amounts of  rectal bleeding, abdominal pain, dizziness, or other complaints thought to be related to the procedure.    4) The patient is recommended to have a repeat colonoscopy in 5 or 10 years.  This will be determined after the biopsies of the colon polyps are reviewed.     Upper GI Endoscopy: What to Expect at Home     Your Recovery   After you have an endoscopy, you will stay at the hospital or clinic for 1 to 2 hours. This will allow the medicine to wear off. You will be able to go home after your doctor or nurse checks to make sure you are not having any problems.   You may have to stay overnight if you had treatment during the test. You may have a sore throat for a day or two after the test.   This care sheet gives you a general idea about what to expect after the test.   How can you care for yourself at home?   Activity   Rest as much as you need to after you go home.   You should be able to go back to your usual activities the day after the test.  Diet   Follow your doctor's directions for eating after the test.   Drink plenty of fluids (unless your doctor has told you not to).  Medications   If you have a sore throat the day after the test, use an over-the-counter spray to numb your throat.  Follow-up care is a key part of your treatment and safety. Be sure to make and go to all appointments, and call your doctor if you are having problems. It's also a good idea to know your test results and keep a list of the medicines you take.   When should you call for help?   Call 911 anytime

## 2024-06-21 NOTE — OP NOTE
hypoxia, cardiovascular events such as arryhthmia, and possibly death.  Additionally, the possibility of missed colonic polyps and interval colon cancer was discussed in the consent.    Description of Procedure: The patient was then taken to the endoscopy suite, placed in the left lateral decubitus position and the above IV sedation was administrered.    The Olympus video endoscope was placed through the patient's oropharynx without difficulty to the extent of the 2nd portion of the duodenum.  Both forward and retroflexed views of the stomach were obtained.      Findings:     1) The Z line was at 40cm from the incisors.  The patient was noted to have small 3mm tongues x 2 of columnar epithelium lining the distal esophagus.  This was closely examined with both white light and narrow band imaging.  This was suspicious for Roque's esophagus.  This was biopsied. No nodules or raised lesions were noted.  2) A small 1 cm sliding hiatal hernia was noted.  3) Mild erythema of the antrum.  Biopsies were obtained from the antrum and body of the stomach to rule out active gastritis and H.pylori gastritis.  4) There was a normal appearing duodenal bulb and second portion of the duodenum. 5) The ampulla was normal appearing.     The scope was then withdrawn back into the stomach, it was decompressed, and the scope was completely withdrawn.    A digital rectal examination was performed and revealed negative without mass, lesions or tenderness.      The Olympus video colonoscope was placed in the patient's rectum under digital direction and advanced to the cecum. The cecum was identified by characteristic anatomy and ballottment.  The prep was good.  The ileocecal valve was identified.     The terminal ileum was showing small focal ileal ulceration without surrounding ileitis (suspect NSAID induced).    The scope was then withdrawn back through the cecum, ascending, transverse, descending, sigmoid colon, and rectum.  Careful  circumferential examination of the mucosa in these areas demonstrated:    A 4 mm polyp in the proximal rectum was removed completely with cold snare polypectomy. Minimal sigmoid diverticulosis was noted.     The scope was then withdrawn into the rectum and retroflexed.  The retroflexed view of the anal verge and rectum demonstrates small internal hemorrhoids.  The scope was straightened, the colon was decompressed and the scope was withdrawn from the patient.  The patient tolerated the procedure well and was taken to the PACU in good condition.    Estimated blood loss:  <5ml    ID Type Source Tests Collected by Time Destination   A : a. antrum bx Tissue Tissue SURGICAL PATHOLOGY Ceasar Pickens Jr., DO 6/21/2024 1221    B : b. distal esophagus bx Tissue Tissue SURGICAL PATHOLOGY Ceasar Pickens Jr., DO 6/21/2024 1222    C : c. rectal polypectomy Tissue Tissue SURGICAL PATHOLOGY Ceasar Pickens Jr., DO 6/21/2024 1231            Impression:    1) See post procedure diagnoses      Recommendations:   1) The patient had biopsies taken today.  The patient should call for results in 7 days if they have not heard from our office.  Our number is [phone]  2) Continue the Nexium 40mg daily  3) The patient had colon polyp(s) successfully removed today. Theres is a small risk for these sites to bleed for up to several weeks out from the procedure. The patient is instructed to call if there is any significant amounts of  rectal bleeding, abdominal pain, dizziness, or other complaints thought to be related to the procedure.    4) The patient is recommended to have a repeat colonoscopy in 5 or 10 years.  This will be determined after the biopsies of the colon polyps are reviewed.       Ceasar Pickens Jr, DO  [facility]  [phone]

## 2024-06-21 NOTE — FLOWSHEET NOTE
Dr. Randolph notified of pt's BP.  No treatment at this time.  Ok to discharge. Pt. Will follow-up with PCP.

## 2024-06-21 NOTE — ANESTHESIA POSTPROCEDURE EVALUATION
Department of Anesthesiology  Postprocedure Note    Patient: Varinder Barney MD  MRN: [id number]  YOB: 1980  Date of evaluation: 6/21/2024    Procedure Summary       Date: 06/21/24 Room / Location: [place] [address] / [facility]    Anesthesia Start: 1212 Anesthesia Stop: 1244    Procedures:       COLONOSCOPY      ESOPHAGOGASTRODUODENOSCOPY BIOPSY Diagnosis:       Gastroesophageal reflux disease, unspecified whether esophagitis present      Colon cancer screening      (Gastroesophageal reflux disease, unspecified whether esophagitis present [K21.9])      (Colon cancer screening [Z12.11])    Surgeons: Ceasar Pickens Jr., DO Responsible Provider: Doug Randolph MD    Anesthesia Type: MAC ASA Status: 2            Anesthesia Type: No value filed.    Alessio Phase I: Alessio Score: 10    Alessio Phase II:      Anesthesia Post Evaluation    Patient location during evaluation: bedside  Patient participation: complete - patient participated  Level of consciousness: awake and alert  Pain score: 0  Nausea & Vomiting: no nausea  Cardiovascular status: hemodynamically stable  Respiratory status: acceptable  Hydration status: stable  Pain management: adequate    No notable events documented.

## 2024-06-21 NOTE — H&P
Pre-operative History and Physical    Patient: Varinder Barney MD  : 1980  Acct#:     Intended Procedure:  EGD and Colonoscopy    HISTORY OF PRESENT ILLNESS:  The patient is a 44 y.o. male  who presents for/due to chronic GERD and Screening colonoscopy- Average risk        Past Medical History:        Diagnosis Date    Appendicitis 2019    Chicken pox     GERD (gastroesophageal reflux disease)     Hyperlipidemia 2024    Seasonal allergies      Past Surgical History:        Procedure Laterality Date    LAPAROSCOPIC APPENDECTOMY N/A 2019    LAPAROSCOPIC APPENDECTOMY performed by Cornelio Jones MD at [facility] OR    VASECTOMY      Dr. Aguirre     Medications Prior to Admission:   No current facility-administered medications on file prior to encounter.     Current Outpatient Medications on File Prior to Encounter   Medication Sig Dispense Refill    levocetirizine (XYZAL) 5 MG tablet Take 1 tablet by mouth nightly      esomeprazole (NEXIUM) 40 MG delayed release capsule Take 1 capsule by mouth every morning (before breakfast) 90 capsule 3    tretinoin (RETIN-A) 0.025 % cream APPLY A PEA SIZED AMOUNT EXTERNALLY TO FACE AT BEDTIME (Patient taking differently: nightly as needed APPLY A PEA SIZED AMOUNT EXTERNALLY TO FACE AT BEDTIME) 20 g 3        Allergies:  Patient has no known allergies.    Social History:   TOBACCO:   reports that he has never smoked. He has never used smokeless tobacco.  ETOH:   reports current alcohol use of about 5.0 standard drinks of alcohol per week.  DRUGS:   reports no history of drug use.    PHYSICAL EXAM:      Vital Signs: BP (!) 147/109   Pulse 84   Temp 97.5 °F (36.4 °C) (Temporal)   Resp 18   Ht 1.753 m (5' 9\")   Wt 86.2 kg (190 lb)   SpO2 97%   BMI 28.06 kg/m²    Airway: No stridor or wheezing noted.  Good air movement  Pulmonary: without wheezes.  Clear to auscultation  Cardiac:regular rate and rhythm without loud murmurs  Abdomen:soft, nontender,  Bowel

## 2024-06-21 NOTE — ANESTHESIA PRE PROCEDURE
assessment may be used as a history and physical.    DOS STAFF ADDENDUM:    Pt seen and examined, chart reviewed (including anesthesia, drug and allergy history).  No interval changes to history and physical examination.  Anesthetic plan, risks, benefits, alternatives, and personnel involved discussed with patient.  Patient verbalized an understanding and agrees to proceed.      Doug Randolph MD  June 21, 2024  11:28 AM

## 2024-08-21 RX ORDER — AMOXICILLIN AND CLAVULANATE POTASSIUM 875; 125 MG/1; MG/1
1 TABLET, FILM COATED ORAL 2 TIMES DAILY
Qty: 14 TABLET | Refills: 0 | Status: SHIPPED | OUTPATIENT
Start: 2024-08-21 | End: 2024-08-28

## 2025-02-20 DIAGNOSIS — M62.89 TIGHTNESS OF LEFT GASTROCNEMIUS MUSCLE: ICD-10-CM

## 2025-02-20 DIAGNOSIS — M54.32 SCIATIC NERVE PAIN, LEFT: Primary | ICD-10-CM

## 2025-02-20 DIAGNOSIS — G57.52 TARSAL TUNNEL SYNDROME OF LEFT SIDE: ICD-10-CM

## 2025-02-26 ENCOUNTER — HOSPITAL ENCOUNTER (OUTPATIENT)
Age: 45
Setting detail: THERAPIES SERIES
Discharge: HOME | End: 2025-02-26
Payer: COMMERCIAL

## 2025-02-26 PROCEDURE — 97110 THERAPEUTIC EXERCISES: CPT | Performed by: PHYSICAL THERAPIST

## 2025-02-26 PROCEDURE — 97161 PT EVAL LOW COMPLEX 20 MIN: CPT | Performed by: PHYSICAL THERAPIST

## 2025-03-12 ENCOUNTER — OFFICE VISIT (OUTPATIENT)
Age: 45
End: 2025-03-12
Payer: COMMERCIAL

## 2025-03-12 DIAGNOSIS — L81.4 SOLAR LENTIGINOSIS: ICD-10-CM

## 2025-03-12 DIAGNOSIS — D22.9 MULTIPLE NEVI: Primary | ICD-10-CM

## 2025-03-12 PROCEDURE — 99213 OFFICE O/P EST LOW 20 MIN: CPT | Performed by: DERMATOLOGY

## 2025-03-12 NOTE — PROGRESS NOTES
[facility]  Varinder Lozano MD  [phone]      Varinder Barney MD  1980    45 y.o. male     Date of Visit: 3/12/2025    Chief Complaint: skin moles    History of Present Illness:    1.  He presents today for evaluation of multiple moles - not aware of any changes in size, color or shape.       2.  He has stable freckling on the upper extremities.     Likes to golf.   Wears sunscreen.    Review of Systems:  Gen: Feels well, good sense of health.      Past Medical History, Family History, Surgical History, Medications and Allergies reviewed.    Past Medical History:   Diagnosis Date    Appendicitis 04/13/2019    Chicken pox     GERD (gastroesophageal reflux disease)     Hyperlipidemia 4/9/2024    Seasonal allergies      Past Surgical History:   Procedure Laterality Date    COLONOSCOPY N/A 6/21/2024    COLONOSCOPY performed by Ceasar Pickens Jr., DO at [facility] ENDOSCOPY    LAPAROSCOPIC APPENDECTOMY N/A 04/13/2019    LAPAROSCOPIC APPENDECTOMY performed by Cornelio Jones MD at [facility] OR    UPPER GASTROINTESTINAL ENDOSCOPY N/A 6/21/2024    ESOPHAGOGASTRODUODENOSCOPY BIOPSY performed by Ceasar Pickens Jr., DO at [facility] ENDOSCOPY    VASECTOMY  2021    Dr. 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       Physical Examination       Well appearing.    1.  Trunk and extremities with multiple well defined round to oval smooth brown macules and papules.     2.  Dorsum of the hands and forearms with

## 2025-04-29 DIAGNOSIS — K21.9 GASTROESOPHAGEAL REFLUX DISEASE WITHOUT ESOPHAGITIS: ICD-10-CM

## 2025-04-29 RX ORDER — ESOMEPRAZOLE MAGNESIUM 40 MG/1
40 CAPSULE, DELAYED RELEASE ORAL
Qty: 90 CAPSULE | Refills: 3 | Status: SHIPPED | OUTPATIENT
Start: 2025-04-29

## 2025-04-29 NOTE — TELEPHONE ENCOUNTER
Last appointment: 4/9/2024  Next appointment: Visit date not found  Return in about 1 year (around 4/9/2025) for Annual Preventive Physical.      Sent [employer] message to schedule due/overdue appointment.     Last refill: 1/25/24

## 2025-05-11 SDOH — ECONOMIC STABILITY: FOOD INSECURITY: WITHIN THE PAST 12 MONTHS, THE FOOD YOU BOUGHT JUST DIDN'T LAST AND YOU DIDN'T HAVE MONEY TO GET MORE.: NEVER TRUE

## 2025-05-11 SDOH — ECONOMIC STABILITY: TRANSPORTATION INSECURITY
IN THE PAST 12 MONTHS, HAS LACK OF TRANSPORTATION KEPT YOU FROM MEETINGS, WORK, OR FROM GETTING THINGS NEEDED FOR DAILY LIVING?: NO

## 2025-05-11 SDOH — ECONOMIC STABILITY: FOOD INSECURITY: WITHIN THE PAST 12 MONTHS, YOU WORRIED THAT YOUR FOOD WOULD RUN OUT BEFORE YOU GOT MONEY TO BUY MORE.: NEVER TRUE

## 2025-05-11 SDOH — ECONOMIC STABILITY: INCOME INSECURITY: IN THE LAST 12 MONTHS, WAS THERE A TIME WHEN YOU WERE NOT ABLE TO PAY THE MORTGAGE OR RENT ON TIME?: NO

## 2025-05-11 SDOH — ECONOMIC STABILITY: TRANSPORTATION INSECURITY
IN THE PAST 12 MONTHS, HAS THE LACK OF TRANSPORTATION KEPT YOU FROM MEDICAL APPOINTMENTS OR FROM GETTING MEDICATIONS?: NO

## 2025-05-11 ASSESSMENT — PATIENT HEALTH QUESTIONNAIRE - PHQ9
SUM OF ALL RESPONSES TO PHQ9 QUESTIONS 1 & 2: 0
2. FEELING DOWN, DEPRESSED OR HOPELESS: NOT AT ALL
SUM OF ALL RESPONSES TO PHQ QUESTIONS 1-9: 0
SUM OF ALL RESPONSES TO PHQ QUESTIONS 1-9: 0
1. LITTLE INTEREST OR PLEASURE IN DOING THINGS: NOT AT ALL
2. FEELING DOWN, DEPRESSED OR HOPELESS: NOT AT ALL
SUM OF ALL RESPONSES TO PHQ QUESTIONS 1-9: 0
1. LITTLE INTEREST OR PLEASURE IN DOING THINGS: NOT AT ALL
SUM OF ALL RESPONSES TO PHQ QUESTIONS 1-9: 0

## 2025-05-14 ENCOUNTER — OFFICE VISIT (OUTPATIENT)
Dept: INTERNAL MEDICINE CLINIC | Age: 45
End: 2025-05-14
Payer: COMMERCIAL

## 2025-05-14 VITALS
OXYGEN SATURATION: 98 % | WEIGHT: 186 LBS | BODY MASS INDEX: 28.19 KG/M2 | SYSTOLIC BLOOD PRESSURE: 126 MMHG | HEIGHT: 68 IN | DIASTOLIC BLOOD PRESSURE: 88 MMHG | HEART RATE: 91 BPM

## 2025-05-14 DIAGNOSIS — E78.5 HYPERLIPIDEMIA, UNSPECIFIED HYPERLIPIDEMIA TYPE: ICD-10-CM

## 2025-05-14 DIAGNOSIS — K21.9 GASTROESOPHAGEAL REFLUX DISEASE WITHOUT ESOPHAGITIS: ICD-10-CM

## 2025-05-14 DIAGNOSIS — Z13.1 SCREENING FOR DIABETES MELLITUS: ICD-10-CM

## 2025-05-14 DIAGNOSIS — K22.70 BARRETT'S ESOPHAGUS WITHOUT DYSPLASIA: ICD-10-CM

## 2025-05-14 DIAGNOSIS — Z00.00 ANNUAL PHYSICAL EXAM: Primary | ICD-10-CM

## 2025-05-14 DIAGNOSIS — H61.21 CERUMINOSIS, RIGHT: ICD-10-CM

## 2025-05-14 PROCEDURE — 99396 PREV VISIT EST AGE 40-64: CPT | Performed by: INTERNAL MEDICINE

## 2025-05-14 NOTE — PATIENT INSTRUCTIONS
Preventive plan of care for Varinder Barney MD        2025           Preventive Measures Status       Recommendations for screening   Prostate Cancer Screen  No results found for: \"PSA\"  Test is due at age 45    Colon Cancer Screen  Last colonoscopy: 2024 Repeat colonoscopy every 5 years.     Repeat EGD-- 3-5 years.      Diabetes Screen  Glucose (mg/dL)   Date Value   2024 94    Test recommended and ordered   Cholesterol Screen  Lab Results   Component Value Date    CHOL 217 (H) 2024    TRIG 129 2024    HDL 56 2024    Test recommended and ordered   Hepatitis C screenin2023 Recommended for patients born between 1945 and 1965-- No need to repeat   HIV screenin2019 Recommended for patients between the ages of 15-65 who have never been tested regardless of risk-- No need to repeat   Aspirin for Cardiovascular Prevention   No Not indicated    Recommended Immunizations    Immunization History   Administered Date(s) Administered    COVID-19, MODERNA BLUE border, Primary or Immunocompromised, (age 12y+), IM, 100 mcg/0.5mL 2020, 2021, 10/26/2021    Influenza Virus Vaccine 10/22/2019, 2021    TDaP, ADACEL (age 10y-64y), BOOSTRIX (age 10y+), IM, 0.5mL 2013, 2023    Influenza vaccine: recommended every fall     Pneumonia vaccine: Due at age 50    Shingles vaccine: Shingrix due at age 50    Tetanus vaccine: tetanus and diptheria/pertussis vaccine (Td/Tdap) recommended every 10 years- due     COVID-19 vaccine-- recommended            Additional Recommendations   1. Use Sunscreen daily when exposed to the sun to reduce the risk of skin cancer.    2. Continue a healthy lifestyle including a healthy diet and aerobic exercise  3. Always wear a seat belt  4. Always wear a helmet when riding a bike or motorcycle  5. Update eye exam every 2 years  6. Perform monthly self testicle checks-report any changes    Here are a few  Reliable websites with a

## 2025-05-14 NOTE — PROGRESS NOTES
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        Medical/Surgical Histories     Past Medical History:   Diagnosis Date    Appendicitis 04/13/2019    Chicken pox     GERD (gastroesophageal reflux disease)     Hyperlipidemia 4/9/2024    Seasonal allergies           Past Surgical History:   Procedure Laterality Date    COLONOSCOPY N/A 6/21/2024    COLONOSCOPY performed by Ceasar Pickens Jr., DO at [facility] ENDOSCOPY    LAPAROSCOPIC APPENDECTOMY N/A 04/13/2019    LAPAROSCOPIC APPENDECTOMY performed by Cornelio Jones MD at [facility] OR    UPPER GASTROINTESTINAL ENDOSCOPY N/A 6/21/2024    ESOPHAGOGASTRODUODENOSCOPY BIOPSY performed by Ceasar Pickens Jr., DO at [facility] ENDOSCOPY    VASECTOMY  2021    Dr. Aguirre           Medications/Allergies     Prior to Visit Medications    Medication Sig   Multiple Vitamin (MULTIVITAMIN PO) Take by mouth daily   Psyllium (METAMUCIL PO) Take by mouth daily   esomeprazole (NEXIUM) 40 MG delayed release capsule Take 1 capsule by mouth every morning (before breakfast)   levocetirizine (XYZAL) 5 MG tablet Take 1 tablet by mouth nightly   tretinoin (RETIN-A) 0.025 % cream APPLY A PEA SIZED AMOUNT EXTERNALLY TO FACE AT BEDTIME        No Known Allergies      Substance Use History     Social History     Tobacco Use    Smoking status: Never    Smokeless tobacco: Never    Tobacco comments:     will not start    Vaping Use    Vaping status: Never Used   Substance Use Topics    Alcohol use: Yes     Alcohol/week: 5.0 standard drinks of alcohol     Types: 3 Glasses of wine, 2 Shots of liquor per week    Drug use: Never          Family History     Family History   Problem Relation Age of Onset    High Blood Pressure Mother     High Blood Pressure

## 2025-05-20 ENCOUNTER — RESULTS FOLLOW-UP (OUTPATIENT)
Dept: INTERNAL MEDICINE CLINIC | Age: 45
End: 2025-05-20

## 2025-05-20 DIAGNOSIS — Z00.00 ANNUAL PHYSICAL EXAM: ICD-10-CM

## 2025-05-20 DIAGNOSIS — Z13.1 SCREENING FOR DIABETES MELLITUS: ICD-10-CM

## 2025-05-20 DIAGNOSIS — E78.5 HYPERLIPIDEMIA, UNSPECIFIED HYPERLIPIDEMIA TYPE: ICD-10-CM

## 2025-05-20 DIAGNOSIS — K21.9 GASTROESOPHAGEAL REFLUX DISEASE WITHOUT ESOPHAGITIS: ICD-10-CM

## 2025-05-20 LAB
ALBUMIN SERPL-MCNC: 5 G/DL (ref 3.4–5)
ALBUMIN/GLOB SERPL: 2.3 {RATIO} (ref 1.1–2.2)
ALP SERPL-CCNC: 75 U/L (ref 40–129)
ALT SERPL-CCNC: 37 U/L (ref 10–40)
ANION GAP SERPL CALCULATED.3IONS-SCNC: 15 MMOL/L (ref 3–16)
AST SERPL-CCNC: 31 U/L (ref 15–37)
BASOPHILS # BLD: 0 K/UL (ref 0–0.2)
BASOPHILS NFR BLD: 0.4 %
BILIRUB SERPL-MCNC: 0.6 MG/DL (ref 0–1)
BUN SERPL-MCNC: 13 MG/DL (ref 7–20)
CALCIUM SERPL-MCNC: 10 MG/DL (ref 8.3–10.6)
CHLORIDE SERPL-SCNC: 100 MMOL/L (ref 99–110)
CHOLEST SERPL-MCNC: 214 MG/DL (ref 0–199)
CO2 SERPL-SCNC: 26 MMOL/L (ref 21–32)
CREAT SERPL-MCNC: 1 MG/DL (ref 0.9–1.3)
DEPRECATED RDW RBC AUTO: 13 % (ref 12.4–15.4)
EOSINOPHIL # BLD: 0.1 K/UL (ref 0–0.6)
EOSINOPHIL NFR BLD: 1.7 %
EST. AVERAGE GLUCOSE BLD GHB EST-MCNC: 102.5 MG/DL
FOLATE SERPL-MCNC: 22.1 NG/ML (ref 4.78–24.2)
GFR SERPLBLD CREATININE-BSD FMLA CKD-EPI: >90 ML/MIN/{1.73_M2}
GLUCOSE SERPL-MCNC: 85 MG/DL (ref 70–99)
HBA1C MFR BLD: 5.2 %
HCT VFR BLD AUTO: 45.8 % (ref 40.5–52.5)
HDLC SERPL-MCNC: 57 MG/DL (ref 40–60)
HGB BLD-MCNC: 15.7 G/DL (ref 13.5–17.5)
LDLC SERPL CALC-MCNC: 137 MG/DL
LYMPHOCYTES # BLD: 1.7 K/UL (ref 1–5.1)
LYMPHOCYTES NFR BLD: 33.5 %
MAGNESIUM SERPL-MCNC: 1.99 MG/DL (ref 1.8–2.4)
MCH RBC QN AUTO: 31.5 PG (ref 26–34)
MCHC RBC AUTO-ENTMCNC: 34.4 G/DL (ref 31–36)
MCV RBC AUTO: 91.6 FL (ref 80–100)
MONOCYTES # BLD: 0.4 K/UL (ref 0–1.3)
MONOCYTES NFR BLD: 7.8 %
NEUTROPHILS # BLD: 2.9 K/UL (ref 1.7–7.7)
NEUTROPHILS NFR BLD: 56.6 %
PLATELET # BLD AUTO: 211 K/UL (ref 135–450)
PMV BLD AUTO: 10.4 FL (ref 5–10.5)
POTASSIUM SERPL-SCNC: 4.7 MMOL/L (ref 3.5–5.1)
PROT SERPL-MCNC: 7.2 G/DL (ref 6.4–8.2)
RBC # BLD AUTO: 5 M/UL (ref 4.2–5.9)
SODIUM SERPL-SCNC: 141 MMOL/L (ref 136–145)
TRIGL SERPL-MCNC: 100 MG/DL (ref 0–150)
TSH SERPL DL<=0.005 MIU/L-ACNC: 1.29 UIU/ML (ref 0.27–4.2)
VIT B12 SERPL-MCNC: 796 PG/ML (ref 211–911)
VLDLC SERPL CALC-MCNC: 20 MG/DL
WBC # BLD AUTO: 5.1 K/UL (ref 4–11)

## (undated) DEVICE — SOLUTION IV 1000ML 0.9% SOD CHL FOR IRRIG PLAS CONT

## (undated) DEVICE — GLOVE SURG SZ 7 L12IN FNGR THK87MIL WHT LTX FREE

## (undated) DEVICE — SNARES COLD OVAL 10MM THIN

## (undated) DEVICE — GARMENT COMPR STD FOR 17IN CALF UNIF THER FLOTRN

## (undated) DEVICE — STAPLER INT L340MM 45MM STD 12 FIRING B FRM PWR + GRIPPING

## (undated) DEVICE — TROCAR: Brand: KII FIOS FIRST ENTRY

## (undated) DEVICE — BITE BLOCK ENDOSCP AD 60 FR W/ ADJ STRP PLAS GRN BLOX

## (undated) DEVICE — DRAPE,LAP,CHOLE,W/TROUGHS,STERILE: Brand: MEDLINE

## (undated) DEVICE — [HIGH FLOW INSUFFLATOR,  DO NOT USE IF PACKAGE IS DAMAGED,  KEEP DRY,  KEEP AWAY FROM SUNLIGHT,  PROTECT FROM HEAT AND RADIOACTIVE SOURCES.]: Brand: PNEUMOSURE

## (undated) DEVICE — SUTURE SZ 0 27IN 5/8 CIR UR-6  TAPER PT VIOLET ABSRB VICRYL J603H

## (undated) DEVICE — BAG SPEC REM 224ML W4XL6IN DIA10MM 1 HND GYN DISP ENDOPCH

## (undated) DEVICE — ENDOSCOPY KIT: Brand: MEDLINE INDUSTRIES, INC.

## (undated) DEVICE — LAPAROSCOPY PK

## (undated) DEVICE — CHLORAPREP 26ML ORANGE

## (undated) DEVICE — ELECTRODE PT RET AD L9FT HI MOIST COND ADH HYDRGEL CORDED

## (undated) DEVICE — SKIN AFFIX SURG ADHESIVE 72/CS 0.55ML: Brand: MEDLINE

## (undated) DEVICE — FORCEPS BX 240CM 2.4MM L NDL RAD JAW 4 M00513334

## (undated) DEVICE — Device

## (undated) DEVICE — CANISTER, RIGID, 1200CC: Brand: MEDLINE INDUSTRIES, INC.

## (undated) DEVICE — GOWN SIRUS NONREIN XL W/TWL: Brand: MEDLINE INDUSTRIES, INC.

## (undated) DEVICE — NEEDLE HYPO 23GA L1.5IN TURQ POLYPR HUB S STL THN WALL IM

## (undated) DEVICE — SOLUTION IV IRRIG POUR BRL 0.9% SODIUM CHL 2F7124

## (undated) DEVICE — SUTURE VCRL SZ 4-0 L18IN ABSRB UD L19MM PS-2 3/8 CIR PRIM J496H

## (undated) DEVICE — SOLUTION ANTIFOG VIS SYS CLEARIFY LAPSCP

## (undated) DEVICE — KIT OR ROOM TURNOVER W/STRAP

## (undated) DEVICE — COVER LT HNDL BLU PLAS

## (undated) DEVICE — DRAPE,UTILITY,TAPE,15X26,STERILE: Brand: MEDLINE